# Patient Record
Sex: FEMALE | NOT HISPANIC OR LATINO | Employment: UNEMPLOYED | URBAN - METROPOLITAN AREA
[De-identification: names, ages, dates, MRNs, and addresses within clinical notes are randomized per-mention and may not be internally consistent; named-entity substitution may affect disease eponyms.]

---

## 2024-04-09 ENCOUNTER — LAB (OUTPATIENT)
Dept: LAB | Facility: LAB | Age: 59
End: 2024-04-09
Payer: COMMERCIAL

## 2024-04-09 ENCOUNTER — OFFICE VISIT (OUTPATIENT)
Dept: RHEUMATOLOGY | Facility: CLINIC | Age: 59
End: 2024-04-09
Payer: COMMERCIAL

## 2024-04-09 VITALS — BODY MASS INDEX: 34 KG/M2 | DIASTOLIC BLOOD PRESSURE: 74 MMHG | SYSTOLIC BLOOD PRESSURE: 135 MMHG | WEIGHT: 195 LBS

## 2024-04-09 DIAGNOSIS — M06.00 SERONEGATIVE RHEUMATOID ARTHRITIS (MULTI): ICD-10-CM

## 2024-04-09 DIAGNOSIS — M06.00 SERONEGATIVE RHEUMATOID ARTHRITIS (MULTI): Primary | ICD-10-CM

## 2024-04-09 LAB
ALBUMIN SERPL BCP-MCNC: 4.1 G/DL (ref 3.4–5)
ALP SERPL-CCNC: 63 U/L (ref 33–136)
ALT SERPL W P-5'-P-CCNC: 31 U/L (ref 7–45)
ANION GAP SERPL CALC-SCNC: 12 MMOL/L (ref 10–20)
AST SERPL W P-5'-P-CCNC: 26 U/L (ref 9–39)
BILIRUB SERPL-MCNC: 0.3 MG/DL (ref 0–1.2)
BUN SERPL-MCNC: 16 MG/DL (ref 6–23)
CALCIUM SERPL-MCNC: 10 MG/DL (ref 8.6–10.6)
CCP IGG SERPL-ACNC: <1 U/ML
CHLORIDE SERPL-SCNC: 104 MMOL/L (ref 98–107)
CO2 SERPL-SCNC: 32 MMOL/L (ref 21–32)
CREAT SERPL-MCNC: 0.69 MG/DL (ref 0.5–1.05)
EGFRCR SERPLBLD CKD-EPI 2021: >90 ML/MIN/1.73M*2
ERYTHROCYTE [DISTWIDTH] IN BLOOD BY AUTOMATED COUNT: 13.8 % (ref 11.5–14.5)
ERYTHROCYTE [SEDIMENTATION RATE] IN BLOOD BY WESTERGREN METHOD: 15 MM/H (ref 0–30)
GLUCOSE SERPL-MCNC: 91 MG/DL (ref 74–99)
HBV CORE IGM SER QL: NONREACTIVE
HBV SURFACE AG SERPL QL IA: NONREACTIVE
HCT VFR BLD AUTO: 40.3 % (ref 36–46)
HCV AB SER QL: NONREACTIVE
HGB BLD-MCNC: 12.7 G/DL (ref 12–16)
MCH RBC QN AUTO: 29.1 PG (ref 26–34)
MCHC RBC AUTO-ENTMCNC: 31.5 G/DL (ref 32–36)
MCV RBC AUTO: 92 FL (ref 80–100)
NRBC BLD-RTO: 0 /100 WBCS (ref 0–0)
PLATELET # BLD AUTO: 254 X10*3/UL (ref 150–450)
POTASSIUM SERPL-SCNC: 4.6 MMOL/L (ref 3.5–5.3)
PROT SERPL-MCNC: 6.8 G/DL (ref 6.4–8.2)
RBC # BLD AUTO: 4.37 X10*6/UL (ref 4–5.2)
RHEUMATOID FACT SER NEPH-ACNC: <10 IU/ML (ref 0–15)
SODIUM SERPL-SCNC: 143 MMOL/L (ref 136–145)
URATE SERPL-MCNC: 4.2 MG/DL (ref 2.3–6.7)
WBC # BLD AUTO: 5.1 X10*3/UL (ref 4.4–11.3)

## 2024-04-09 PROCEDURE — 80053 COMPREHEN METABOLIC PANEL: CPT

## 2024-04-09 PROCEDURE — 86431 RHEUMATOID FACTOR QUANT: CPT

## 2024-04-09 PROCEDURE — 99204 OFFICE O/P NEW MOD 45 MIN: CPT | Performed by: INTERNAL MEDICINE

## 2024-04-09 PROCEDURE — 84550 ASSAY OF BLOOD/URIC ACID: CPT

## 2024-04-09 PROCEDURE — 85027 COMPLETE CBC AUTOMATED: CPT

## 2024-04-09 PROCEDURE — 86705 HEP B CORE ANTIBODY IGM: CPT

## 2024-04-09 PROCEDURE — 85652 RBC SED RATE AUTOMATED: CPT

## 2024-04-09 PROCEDURE — 87340 HEPATITIS B SURFACE AG IA: CPT

## 2024-04-09 PROCEDURE — 86200 CCP ANTIBODY: CPT

## 2024-04-09 PROCEDURE — 86481 TB AG RESPONSE T-CELL SUSP: CPT

## 2024-04-09 PROCEDURE — 1036F TOBACCO NON-USER: CPT | Performed by: INTERNAL MEDICINE

## 2024-04-09 PROCEDURE — 86803 HEPATITIS C AB TEST: CPT

## 2024-04-09 PROCEDURE — 36415 COLL VENOUS BLD VENIPUNCTURE: CPT

## 2024-04-09 RX ORDER — HYDROCORTISONE ACETATE 25 MG/1
25 SUPPOSITORY RECTAL 2 TIMES DAILY
COMMUNITY
Start: 2024-01-22

## 2024-04-09 RX ORDER — ROSUVASTATIN CALCIUM 20 MG/1
1 TABLET, COATED ORAL NIGHTLY
COMMUNITY
Start: 2023-12-13

## 2024-04-09 RX ORDER — OMEPRAZOLE 20 MG/1
20 CAPSULE, DELAYED RELEASE ORAL
COMMUNITY
Start: 2023-01-13

## 2024-04-09 RX ORDER — LEFLUNOMIDE 10 MG/1
10 TABLET ORAL
COMMUNITY
End: 2024-04-09

## 2024-04-09 RX ORDER — ADALIMUMAB 40MG/0.4ML
40 KIT SUBCUTANEOUS
Qty: 2 EACH | Refills: 3 | Status: SHIPPED | OUTPATIENT
Start: 2024-04-09 | End: 2024-05-09

## 2024-04-09 RX ORDER — HYDROXYCHLOROQUINE SULFATE 200 MG/1
400 TABLET, FILM COATED ORAL EVERY OTHER DAY
Qty: 180 TABLET | Refills: 0 | Status: SHIPPED | OUTPATIENT
Start: 2024-04-09 | End: 2024-06-03 | Stop reason: DRUGHIGH

## 2024-04-09 RX ORDER — METHYLPREDNISOLONE 4 MG
1 TABLET, DOSE PACK ORAL EVERY OTHER DAY
COMMUNITY

## 2024-04-09 RX ORDER — DICYCLOMINE HYDROCHLORIDE 20 MG/1
20 TABLET ORAL
COMMUNITY
Start: 2021-02-23

## 2024-04-09 RX ORDER — ASPIRIN 81 MG/1
81 TABLET ORAL
COMMUNITY
Start: 2022-08-17

## 2024-04-09 RX ORDER — LEFLUNOMIDE 20 MG/1
20 TABLET ORAL DAILY
Qty: 90 TABLET | Refills: 0 | Status: SHIPPED | OUTPATIENT
Start: 2024-04-09 | End: 2024-07-08

## 2024-04-09 RX ORDER — ALBUTEROL SULFATE 90 UG/1
2 AEROSOL, METERED RESPIRATORY (INHALATION) EVERY 4 HOURS PRN
COMMUNITY
Start: 2023-09-07

## 2024-04-09 RX ORDER — CALCIUM CARBONATE 200(500)MG
TABLET,CHEWABLE ORAL
COMMUNITY
Start: 2007-09-18

## 2024-04-09 RX ORDER — VITAMIN E MIXED 400 UNIT
400 CAPSULE ORAL EVERY OTHER DAY
COMMUNITY

## 2024-04-09 RX ORDER — GUAIFENESIN AND PHENYLEPHRINE HCL 400; 10 MG/1; MG/1
TABLET ORAL
COMMUNITY

## 2024-04-09 RX ORDER — ADALIMUMAB 40MG/0.4ML
40 KIT SUBCUTANEOUS
COMMUNITY
Start: 2024-04-05 | End: 2024-04-09 | Stop reason: SDUPTHER

## 2024-04-09 RX ORDER — LEFLUNOMIDE 20 MG/1
20 TABLET ORAL DAILY
COMMUNITY
End: 2024-04-09 | Stop reason: SDUPTHER

## 2024-04-09 RX ORDER — NITROGLYCERIN 0.4 MG/1
0.4 TABLET SUBLINGUAL
COMMUNITY
Start: 2022-08-20

## 2024-04-09 RX ORDER — HYDROXYCHLOROQUINE SULFATE 200 MG/1
400 TABLET, FILM COATED ORAL EVERY OTHER DAY
COMMUNITY
Start: 2013-05-17 | End: 2024-04-09 | Stop reason: SDUPTHER

## 2024-04-09 RX ORDER — LOSARTAN POTASSIUM 100 MG/1
100 TABLET ORAL
COMMUNITY
Start: 2023-09-15

## 2024-04-09 RX ORDER — METOPROLOL SUCCINATE 25 MG/1
25 TABLET, EXTENDED RELEASE ORAL
COMMUNITY
Start: 2023-09-15

## 2024-04-09 RX ORDER — LOPERAMIDE HYDROCHLORIDE 2 MG/1
2 CAPSULE ORAL EVERY 6 HOURS PRN
COMMUNITY
Start: 2024-03-18 | End: 2024-04-17

## 2024-04-09 NOTE — PATIENT INSTRUCTIONS
Continue Humira, leflunomide and hydroxychloroquine as prescribed.  Call me if any question.  Follow-up in 4 months.

## 2024-04-11 LAB
NIL(NEG) CONTROL SPOT COUNT: NORMAL
PANEL A SPOT COUNT: 0
PANEL B SPOT COUNT: 0
POS CONTROL SPOT COUNT: NORMAL
T-SPOT. TB INTERPRETATION: NEGATIVE

## 2024-06-03 ENCOUNTER — TELEPHONE (OUTPATIENT)
Dept: PRIMARY CARE | Facility: CLINIC | Age: 59
End: 2024-06-03
Payer: COMMERCIAL

## 2024-06-03 DIAGNOSIS — M06.00 SERONEGATIVE RHEUMATOID ARTHRITIS (MULTI): ICD-10-CM

## 2024-06-03 RX ORDER — HYDROXYCHLOROQUINE SULFATE 200 MG/1
400 TABLET, FILM COATED ORAL DAILY
Qty: 180 TABLET | Refills: 0 | Status: SHIPPED | OUTPATIENT
Start: 2024-06-03 | End: 2024-06-03 | Stop reason: SDUPTHER

## 2024-06-03 RX ORDER — HYDROXYCHLOROQUINE SULFATE 200 MG/1
400 TABLET, FILM COATED ORAL DAILY
Qty: 180 TABLET | Refills: 0 | Status: SHIPPED | OUTPATIENT
Start: 2024-06-03 | End: 2024-09-01

## 2024-06-03 NOTE — TELEPHONE ENCOUNTER
Patient stated she is out of the following medication because the prescription was writtten to take 2 tablets every other day but, she takes 2 tablets daily. Patient requesting a corrected prescription for   hydroxychloroquine (Plaquenil) 200 mg tablet [[id number]] take 2 tablets every day    Order Details  Dose: 400 mg   Please send to [facility] in [place].

## 2024-08-05 DIAGNOSIS — M06.00 SERONEGATIVE RHEUMATOID ARTHRITIS (MULTI): ICD-10-CM

## 2024-08-05 RX ORDER — LEFLUNOMIDE 20 MG/1
20 TABLET ORAL DAILY
Qty: 90 TABLET | Refills: 0 | Status: SHIPPED | OUTPATIENT
Start: 2024-08-05 | End: 2024-11-03

## 2024-08-09 ENCOUNTER — APPOINTMENT (OUTPATIENT)
Dept: RHEUMATOLOGY | Facility: CLINIC | Age: 59
End: 2024-08-09
Payer: COMMERCIAL

## 2024-08-09 ENCOUNTER — LAB (OUTPATIENT)
Dept: LAB | Facility: LAB | Age: 59
End: 2024-08-09
Payer: COMMERCIAL

## 2024-08-09 VITALS — WEIGHT: 195 LBS | BODY MASS INDEX: 34 KG/M2

## 2024-08-09 DIAGNOSIS — M06.00 SERONEGATIVE RHEUMATOID ARTHRITIS (MULTI): Primary | ICD-10-CM

## 2024-08-09 DIAGNOSIS — M06.00 SERONEGATIVE RHEUMATOID ARTHRITIS (MULTI): ICD-10-CM

## 2024-08-09 LAB
ALBUMIN SERPL BCP-MCNC: 4.4 G/DL (ref 3.4–5)
ALP SERPL-CCNC: 67 U/L (ref 33–136)
ALT SERPL W P-5'-P-CCNC: 34 U/L (ref 7–45)
AST SERPL W P-5'-P-CCNC: 30 U/L (ref 9–39)
BILIRUB DIRECT SERPL-MCNC: 0.1 MG/DL (ref 0–0.3)
BILIRUB SERPL-MCNC: 0.4 MG/DL (ref 0–1.2)
CRP SERPL-MCNC: 0.2 MG/DL
ERYTHROCYTE [DISTWIDTH] IN BLOOD BY AUTOMATED COUNT: 13.8 % (ref 11.5–14.5)
ERYTHROCYTE [SEDIMENTATION RATE] IN BLOOD BY WESTERGREN METHOD: 6 MM/H (ref 0–30)
HCT VFR BLD AUTO: 40.7 % (ref 36–46)
HGB BLD-MCNC: 12.8 G/DL (ref 12–16)
MCH RBC QN AUTO: 29 PG (ref 26–34)
MCHC RBC AUTO-ENTMCNC: 31.4 G/DL (ref 32–36)
MCV RBC AUTO: 92 FL (ref 80–100)
NRBC BLD-RTO: 0 /100 WBCS (ref 0–0)
PLATELET # BLD AUTO: 229 X10*3/UL (ref 150–450)
PROT SERPL-MCNC: 7 G/DL (ref 6.4–8.2)
RBC # BLD AUTO: 4.41 X10*6/UL (ref 4–5.2)
WBC # BLD AUTO: 4.8 X10*3/UL (ref 4.4–11.3)

## 2024-08-09 PROCEDURE — 80076 HEPATIC FUNCTION PANEL: CPT

## 2024-08-09 PROCEDURE — 85027 COMPLETE CBC AUTOMATED: CPT

## 2024-08-09 PROCEDURE — 86140 C-REACTIVE PROTEIN: CPT

## 2024-08-09 PROCEDURE — 85652 RBC SED RATE AUTOMATED: CPT

## 2024-08-09 PROCEDURE — 99213 OFFICE O/P EST LOW 20 MIN: CPT | Performed by: INTERNAL MEDICINE

## 2024-08-09 PROCEDURE — 36415 COLL VENOUS BLD VENIPUNCTURE: CPT

## 2024-08-09 PROCEDURE — 1036F TOBACCO NON-USER: CPT | Performed by: INTERNAL MEDICINE

## 2024-08-09 RX ORDER — HYDROXYCHLOROQUINE SULFATE 200 MG/1
400 TABLET, FILM COATED ORAL DAILY
Qty: 180 TABLET | Refills: 0 | Status: SHIPPED | OUTPATIENT
Start: 2024-08-09 | End: 2024-11-07

## 2024-08-09 RX ORDER — ADALIMUMAB 40MG/0.4ML
40 KIT SUBCUTANEOUS
Qty: 2 EACH | Refills: 2 | Status: SHIPPED | OUTPATIENT
Start: 2024-08-09 | End: 2024-08-09 | Stop reason: SDUPTHER

## 2024-08-09 RX ORDER — ADALIMUMAB 40MG/0.4ML
40 KIT SUBCUTANEOUS
Qty: 2 EACH | Refills: 2 | Status: SHIPPED | OUTPATIENT
Start: 2024-08-09

## 2024-08-09 RX ORDER — METHYLPREDNISOLONE 4 MG/1
TABLET ORAL
Qty: 21 TABLET | Refills: 0 | Status: SHIPPED | OUTPATIENT
Start: 2024-08-09 | End: 2024-08-16

## 2024-08-09 NOTE — PROGRESS NOTES
Subjective  . Karoline Hung is a 59 y.o. female who presents for Follow-up.    HPI. 59-year-old female with history of seronegative inflammatory arthritis, +ve HLA-B27, CAD s/p PCI, sleep GERD, plantar fasciitis, neuropathy and s/p L4-L5 surgery presented for follow-up.    She reports worsening soreness and stiffness in the hands and feet for the past 1-1/2-week.  Also notes soreness in the neck and shoulders.  She rates pain up to 5/10 at times.  She has not noticed swelling of the hand and feet.  She received corticosteroid injection at the left second toe area for bursitis about 3 weeks ago at podiatrist office.      Immunosuppression: Hydroxychloroquine, leflunomide and Humira.(1/2024).     Past immunosuppression:  1.  Prednisone.  2.  Methotrexate.  Excessive mucus production.    Review of Systems   All other systems reviewed and are negative.    Objective     Weight 88.5 kg (195 lb).    Physical Exam.  Gen. AAO x3, NAD.  HEENT: No pallor or icterus, PERRLA, EOMI. No cervical lymphadenopathy .  Skin: No rashes.  Heart: S1, S2/ RRR.   Lungs: CTA B.  Abdomen: Soft, NT/ND.  MSK: Bilateral Heberden's nodes.  No swelling and tenderness of the MCP and PIP joints.  Bilateral wrist without swelling and tenderness.  Handgrip fine.  Bilateral elbows without swelling and tenderness.  Bilateral shoulder with full range of motion.  Bilateral ankle and MTPs without swelling and tenderness.    Neuro: Sensation to touch intact.Strength 5/5 throughout.   Psych:Appropriate mood and behavior  EXT: No edema    Assessment/Plan  . 59-year-old female with history of seronegative inflammatory arthritis, +ve HLA-B27, CAD s/p PCI, sleep GERD, plantar fasciitis, neuropathy and s/p L4-L5 surgery presented for follow-up.      #1: Seronegative inflammatory arthritis.  -Joint pain appears noninflammatory, however we will give a trial of Medrol Dosepak to see the response.  -Continue Humira injection every other week.  -Continue  leflunomide 20 mg daily.  -Continue hydroxychloroquine 2 pills daily.  Annual eye exam discussed.  -Labs.    Follow-up in 4 months.     This note was partially generated using the Dragon Voice recognition system. There may be some incorrect wording, spelling and/or spelling errors or punctuation errors that were not corrected prior to committing the note to the medical record.    Problem List Items Addressed This Visit    None  Visit Diagnoses       Seronegative rheumatoid arthritis (Multi)    -  Primary    Relevant Medications    hydroxychloroquine (Plaquenil) 200 mg tablet    Humira,CF, Pen 40 mg/0.4 mL pen injector kit pen-injector    methylPREDNISolone (Medrol Dospak) 4 mg tablets    Other Relevant Orders    C-Reactive Protein    CBC    Hepatic Function Panel    Sedimentation Rate                 Active Ambulatory Problems     Diagnosis Date Noted    No Active Ambulatory Problems     Resolved Ambulatory Problems     Diagnosis Date Noted    No Resolved Ambulatory Problems     Past Medical History:   Diagnosis Date    Coronary artery disease     GERD (gastroesophageal reflux disease)     HLA B27 positive     Hyperlipidemia     Hypertension     Personal history of other specified conditions 03/23/2021    Seronegative inflammatory arthritis        Family History   Problem Relation Name Age of Onset    Arthritis Mother      Arthritis Father      Arthritis Maternal Grandmother         No past surgical history on file.    Social History     Tobacco Use   Smoking Status Former    Types: Cigarettes   Smokeless Tobacco Never       Allergies  Ticagrelor, Lisinopril, Clonidine, and Sulfa (sulfonamide antibiotics)    Current Meds  Current Outpatient Medications   Medication Instructions    albuterol 90 mcg/actuation inhaler 2 puffs, inhalation, Every 4 hours PRN    aspirin 81 mg, oral, Daily RT    B complex-vitamin C-folic acid (Nephro-Arya Rx) 1- mg-mg-mcg tablet 1 tablet, oral, Every other day    calcium carbonate  (Calcium Antacid) 200 mg calcium chewable tablet oral    dicyclomine (BENTYL) 20 mg, oral, Daily RT    glucosamine sulfate 500 mg tablet 1 tablet, oral, Every other day    Humira(CF) Pen 40 mg, subcutaneous, Every 14 days    hydrocortisone (ANUSOL-HC) 25 mg, rectal, 2 times daily    hydroxychloroquine (PLAQUENIL) 400 mg, oral, Daily    leflunomide (ARAVA) 20 mg, oral, Daily    losartan (COZAAR) 100 mg, oral, Daily RT    methylPREDNISolone (Medrol Dospak) 4 mg tablets Follow schedule on package instructions    metoprolol succinate XL (TOPROL-XL) 25 mg, oral, Daily RT    nitroglycerin (NITROSTAT) 0.4 mg, sublingual    omeprazole (PRILOSEC) 20 mg, oral, Daily RT    prasterone (dhea) 50 mg, oral, Every other day    rosuvastatin (Crestor) 20 mg tablet 1 tablet, oral, Nightly    turmeric root extract 500 mg capsule oral, Every 48 hours    vitamin E 400 Units, oral, Every other day        Lab Results   Component Value Date    RF <10 04/09/2024    SEDRATE 15 04/09/2024    URICACID 4.2 04/09/2024             Nehemiah Ram MD

## 2024-08-09 NOTE — PATIENT INSTRUCTIONS
Take Medrol Dosepak.  Continue Humira injection, hydroxychloroquine and leflunomide as discussed.  Call me if any question.  Follow-up in 4 months.

## 2024-09-28 ENCOUNTER — OFFICE VISIT (OUTPATIENT)
Dept: URGENT CARE | Age: 59
End: 2024-09-28
Payer: COMMERCIAL

## 2024-09-28 ENCOUNTER — ANCILLARY PROCEDURE (OUTPATIENT)
Dept: URGENT CARE | Age: 59
End: 2024-09-28
Payer: COMMERCIAL

## 2024-09-28 VITALS
TEMPERATURE: 98.3 F | DIASTOLIC BLOOD PRESSURE: 65 MMHG | SYSTOLIC BLOOD PRESSURE: 141 MMHG | BODY MASS INDEX: 33.66 KG/M2 | RESPIRATION RATE: 16 BRPM | HEART RATE: 63 BPM | WEIGHT: 190 LBS | HEIGHT: 63 IN | OXYGEN SATURATION: 93 %

## 2024-09-28 DIAGNOSIS — M79.671 RIGHT FOOT PAIN: Primary | ICD-10-CM

## 2024-09-28 DIAGNOSIS — S90.851A FOREIGN BODY IN RIGHT FOOT, INITIAL ENCOUNTER: ICD-10-CM

## 2024-09-28 DIAGNOSIS — M79.671 RIGHT FOOT PAIN: ICD-10-CM

## 2024-09-28 RX ORDER — DOXYCYCLINE 100 MG/1
100 CAPSULE ORAL 2 TIMES DAILY
Qty: 20 CAPSULE | Refills: 0 | Status: SHIPPED | OUTPATIENT
Start: 2024-09-28 | End: 2024-10-08

## 2024-09-28 NOTE — PROGRESS NOTES
Subjective   Patient ID: Karoline Hung is a 59 y.o. female. They present today with a chief complaint of Foot Swelling (C/o ball of foot swelling, painful and hot since yesterday).    History of Present Illness  HPI  Pt is a 59 yr old female who presents with foot pain and swelling she said the swelling started today.  She stepped on a needle 2 weeks ago in the same foot she is not sure if its from that incident  Past Medical History  Allergies as of 09/28/2024 - Reviewed 08/09/2024   Allergen Reaction Noted    Ticagrelor Shortness of breath 03/01/2023    Lisinopril Cough 07/15/2022    Clonidine Itching 03/01/2023    Sulfa (sulfonamide antibiotics) Rash 04/09/2024       (Not in a hospital admission)       Past Medical History:   Diagnosis Date    Coronary artery disease     GERD (gastroesophageal reflux disease)     HLA B27 positive     Hyperlipidemia     Hypertension     Personal history of other specified conditions 03/23/2021    History of multiple pulmonary nodules    Seronegative inflammatory arthritis        No past surgical history on file.     reports that she has quit smoking. Her smoking use included cigarettes. She has never used smokeless tobacco.    Review of Systems  Review of Systems  Gen: No fatigue, fever, sweats.  Head: No headache, trauma.  Eyes: No vision loss, double vision, drainage, eye pain.  ENT: No hearing changes, pain, epistaxis, congestion  Cardiac: No chest pain  Pulmonary: No shortness of breath,  pleuritic pain,   Heme/lymph: No swollen glands  GI: No abdominal pain, nausea, vomiting, diarrhea  : No  dysuria, frequency, urgency, hematuria  Musculoskeletal: + limb pain, joint pain, back pain, + swelling and stiffness.  Skin: No rashes, pruritus, lumps, lesions.  Neuro: No Numbness, tingling, or weakness.  Psych: No  anxiety     Review of systems is otherwise negative unless stated above or in history of present illness.                             Objective    Vitals:     "09/28/24 1727   BP: 141/65   BP Location: Left arm   Patient Position: Sitting   BP Cuff Size: Adult long   Pulse: 63   Resp: 16   Temp: 36.8 °C (98.3 °F)   TempSrc: Oral   SpO2: 93%   Weight: 86.2 kg (190 lb)   Height: 1.6 m (5' 3\")     No LMP recorded.    Physical Exam  Foot Assessment:  General: Vitals noted. No distress. Afebrile.  Neck: Supple. No adenopathy.  Cardiac: Regular rate and rhythm. No murmur.  Pulmonary: Equal breath sounds bilaterally. No adventitious breath sounds.  Abdomen: Soft, nontender, nonsurgical. Normoactive bowel sounds  Back: Nontender throughout  Lower extremity: Exam of the right foot shows + swelling and pain to the top of her foot near the base of her toes.  Old puncture wound to the bottom of the foot plantar aspect base of toes. The ankle is non-tender. There is no deformity, edema, ecchymosis. Is neurovascularly intact distally. The remainder of the lower extremity is non-tender.  Neuro: No focal neurologic deficits, NIH score of 0.  Procedures    Point of Care Test & Imaging Results from this visit  No results found for this visit on 09/28/24.   No results found.    Diagnostic study results (if any) were reviewed by JOSSELINE YATES.    Assessment/Plan   Allergies, medications, history, and pertinent labs/EKGs/Imaging reviewed by CIRO Fraser-CNP.     Medical Decision Making  History and physical is consistent with foreign body in foot confirmed by xray and possible infection at the site.  She will be sent to podiatry a referral was given.  She will be put on abx in case of infection.  No fracture or dislocation on xray.      Orders and Diagnoses  There are no diagnoses linked to this encounter.    Medical Admin Record      Patient disposition: Home    Electronically signed by JOSSELINE BELLAMY X-RAY  5:31 PM      "

## 2024-09-28 NOTE — PATIENT INSTRUCTIONS
You have a foreign body in the foot we will put you on abx and follow up with podiatry.  Abx are in case it is getting infected due to it being in the foot.

## 2024-10-04 ENCOUNTER — APPOINTMENT (OUTPATIENT)
Dept: PRIMARY CARE | Facility: CLINIC | Age: 59
End: 2024-10-04
Payer: COMMERCIAL

## 2024-10-04 ENCOUNTER — LAB (OUTPATIENT)
Dept: LAB | Facility: LAB | Age: 59
End: 2024-10-04
Payer: COMMERCIAL

## 2024-10-04 VITALS — SYSTOLIC BLOOD PRESSURE: 137 MMHG | DIASTOLIC BLOOD PRESSURE: 77 MMHG

## 2024-10-04 DIAGNOSIS — R74.8 ABNORMAL LEVELS OF OTHER SERUM ENZYMES: Primary | ICD-10-CM

## 2024-10-04 DIAGNOSIS — K64.8 INTERNAL HEMORRHOID: Primary | ICD-10-CM

## 2024-10-04 DIAGNOSIS — I10 PRIMARY HYPERTENSION: ICD-10-CM

## 2024-10-04 DIAGNOSIS — Z00.00 HEALTH CARE MAINTENANCE: Primary | ICD-10-CM

## 2024-10-04 DIAGNOSIS — E78.2 MIXED HYPERLIPIDEMIA: ICD-10-CM

## 2024-10-04 DIAGNOSIS — M47.892 OTHER OSTEOARTHRITIS OF SPINE, CERVICAL REGION: ICD-10-CM

## 2024-10-04 LAB — CK SERPL-CCNC: 168 U/L (ref 0–215)

## 2024-10-04 PROCEDURE — 3078F DIAST BP <80 MM HG: CPT | Performed by: INTERNAL MEDICINE

## 2024-10-04 PROCEDURE — 99204 OFFICE O/P NEW MOD 45 MIN: CPT | Performed by: INTERNAL MEDICINE

## 2024-10-04 PROCEDURE — 36415 COLL VENOUS BLD VENIPUNCTURE: CPT

## 2024-10-04 PROCEDURE — 82550 ASSAY OF CK (CPK): CPT

## 2024-10-04 PROCEDURE — 3075F SYST BP GE 130 - 139MM HG: CPT | Performed by: INTERNAL MEDICINE

## 2024-10-04 RX ORDER — FLUTICASONE PROPIONATE 50 MCG
2 SPRAY, SUSPENSION (ML) NASAL DAILY
COMMUNITY
Start: 2024-06-10 | End: 2024-10-04 | Stop reason: SDUPTHER

## 2024-10-04 NOTE — PROGRESS NOTES
Subjective   Patient ID: Karoline Hung is a 59 y.o. female who presents for No chief complaint on file..    HPI the patient for first time Sipp dated.  She no chest pain no shortness of breath has some rectal bump for some many months  Maybe or maybe not associated with hemorrhoids with no bleeding feels when she wipes has had a foot issue including dermatitis and it stepped on a foreign body has been following up with the podiatrist blood pressure has been elevated elsewhere but was better today follows up with the rheumatologist is on Humira and other antiimmune medications bowels otherwise normal no dysuria    Past medical history noted and unchanged    Medications including Crestor    Allergies noted and unchanged see EMR       vital signs noted alert and oriented x 3 NCAT  Review of Systems    Objective   There were no vitals taken for this visit.    Physical Exam no JVD or bruit chest clear to auscultation and percussion CV regular rate and rhythm S1-S2 without murmur gallop or rub abdomen obese soft nontender normal active bowel sounds rectum there is a right-sided internal hemorrhoid with mild rectal prolapse no bleeding extremities no clubbing cyanosis or edema normal distal pulses DTR 2+ msk hand and feet arthritis change, cervical spine ok rom limited extension    Assessment/Plan impression hypertension rectal prolapse/internal hemorrhoid hyperlipidemia other diagnoses rheumatoid diagnoses OA neck  Plan rom exercise for the neck reviewed films and disc and forameni d/w rheumatoid and feet and hands advise on alternate medication and continue with rom exercise there is also eczematous or psoriasis eczematous changes on the anterior of the right foot corticosteroid cream was recently prescribed by the podiatrist continue follow-up with the foreign body with the podiatrist continue with diet exercise weight loss especially her swimming class for the blood pressure follow-up with additional blood work  previous blood work reviewed and the lipids with the Crestor follow-up with cardiology diet weight loss exercise stool softener and Metamucil and follow-up with colorectal surgery she is up-to-date on her colonoscopy per her see EMR then recheck 1 month based on above  [id number] [id number]

## 2024-10-05 RX ORDER — FLUTICASONE PROPIONATE 50 MCG
2 SPRAY, SUSPENSION (ML) NASAL DAILY
Qty: 16 G | Refills: 3 | Status: SHIPPED | OUTPATIENT
Start: 2024-10-05

## 2024-10-07 DIAGNOSIS — R19.7 DIARRHEA, UNSPECIFIED TYPE: Primary | ICD-10-CM

## 2024-10-07 RX ORDER — LOPERAMIDE HYDROCHLORIDE 2 MG/1
2 CAPSULE ORAL EVERY 6 HOURS PRN
COMMUNITY
Start: 2024-05-24 | End: 2024-10-07 | Stop reason: SDUPTHER

## 2024-10-10 RX ORDER — LOPERAMIDE HYDROCHLORIDE 2 MG/1
CAPSULE ORAL
Qty: 30 CAPSULE | Refills: 0 | Status: SHIPPED | OUTPATIENT
Start: 2024-10-10

## 2024-10-15 ENCOUNTER — TELEPHONE (OUTPATIENT)
Dept: PRIMARY CARE | Facility: CLINIC | Age: 59
End: 2024-10-15

## 2024-10-29 DIAGNOSIS — M06.00 SERONEGATIVE RHEUMATOID ARTHRITIS (MULTI): ICD-10-CM

## 2024-10-29 RX ORDER — LEFLUNOMIDE 20 MG/1
20 TABLET ORAL DAILY
Qty: 10 TABLET | Refills: 0 | Status: SHIPPED | OUTPATIENT
Start: 2024-10-29

## 2024-10-30 ENCOUNTER — OFFICE VISIT (OUTPATIENT)
Dept: DERMATOLOGY | Facility: CLINIC | Age: 59
End: 2024-10-30
Payer: COMMERCIAL

## 2024-10-30 DIAGNOSIS — R21 RASH AND OTHER NONSPECIFIC SKIN ERUPTION: Primary | ICD-10-CM

## 2024-10-30 DIAGNOSIS — I78.1 TELANGIECTASIA: ICD-10-CM

## 2024-10-30 PROCEDURE — 11104 PUNCH BX SKIN SINGLE LESION: CPT | Performed by: DERMATOLOGY

## 2024-10-30 PROCEDURE — 1036F TOBACCO NON-USER: CPT | Performed by: DERMATOLOGY

## 2024-10-30 PROCEDURE — 99204 OFFICE O/P NEW MOD 45 MIN: CPT | Performed by: DERMATOLOGY

## 2024-10-30 RX ORDER — ALCLOMETASONE DIPROPIONATE 0.5 MG/G
CREAM TOPICAL 2 TIMES DAILY
COMMUNITY

## 2024-10-30 RX ORDER — CLOBETASOL PROPIONATE 0.5 MG/G
CREAM TOPICAL
Qty: 60 G | Refills: 2 | Status: SHIPPED | OUTPATIENT
Start: 2024-10-30

## 2024-10-30 ASSESSMENT — DERMATOLOGY PATIENT ASSESSMENT
DO YOU HAVE IRREGULAR MENSTRUAL CYCLES: NO
DO YOU USE A TANNING BED: NO
DO YOU USE SUNSCREEN: OCCASIONALLY
WHERE ARE THESE NEW OR CHANGING LESIONS LOCATED: NOSE
ARE YOU TRYING TO GET PREGNANT: NO
DO YOU HAVE ANY NEW OR CHANGING LESIONS: YES
ARE YOU AN ORGAN TRANSPLANT RECIPIENT: NO

## 2024-10-30 ASSESSMENT — ITCH NUMERIC RATING SCALE: HOW SEVERE IS YOUR ITCHING?: 6

## 2024-10-30 ASSESSMENT — DERMATOLOGY QUALITY OF LIFE (QOL) ASSESSMENT
RATE HOW BOTHERED YOU ARE BY SYMPTOMS OF YOUR SKIN PROBLEM (EG, ITCHING, STINGING BURNING, HURTING OR SKIN IRRITATION): 4
RATE HOW BOTHERED YOU ARE BY EFFECTS OF YOUR SKIN PROBLEMS ON YOUR ACTIVITIES (EG, GOING OUT, ACCOMPLISHING WHAT YOU WANT, WORK ACTIVITIES OR YOUR RELATIONSHIPS WITH OTHERS): 2
ARE THERE EXCLUSIONS OR EXCEPTIONS FOR THE QUALITY OF LIFE ASSESSMENT: NO
WHAT SINGLE SKIN CONDITION LISTED BELOW IS THE PATIENT ANSWERING THE QUALITY-OF-LIFE ASSESSMENT QUESTIONS ABOUT: DERMATITIS
RATE HOW EMOTIONALLY BOTHERED YOU ARE BY YOUR SKIN PROBLEM (FOR EXAMPLE, WORRY, EMBARRASSMENT, FRUSTRATION): 4

## 2024-10-30 ASSESSMENT — PATIENT GLOBAL ASSESSMENT (PGA): PATIENT GLOBAL ASSESSMENT: PATIENT GLOBAL ASSESSMENT:  3 - MODERATE

## 2024-11-01 ENCOUNTER — TELEPHONE (OUTPATIENT)
Dept: DERMATOLOGY | Facility: CLINIC | Age: 59
End: 2024-11-01
Payer: COMMERCIAL

## 2024-11-01 LAB
LABORATORY COMMENT REPORT: NORMAL
PATH REPORT.FINAL DX SPEC: NORMAL
PATH REPORT.GROSS SPEC: NORMAL
PATH REPORT.RELEVANT HX SPEC: NORMAL
PATH REPORT.TOTAL CANCER: NORMAL

## 2024-11-05 ENCOUNTER — TELEPHONE (OUTPATIENT)
Dept: PRIMARY CARE | Facility: CLINIC | Age: 59
End: 2024-11-05
Payer: COMMERCIAL

## 2024-11-05 ENCOUNTER — TELEPHONE (OUTPATIENT)
Dept: RHEUMATOLOGY | Facility: CLINIC | Age: 59
End: 2024-11-05
Payer: COMMERCIAL

## 2024-11-05 DIAGNOSIS — M06.00 SERONEGATIVE RHEUMATOID ARTHRITIS (MULTI): ICD-10-CM

## 2024-11-05 RX ORDER — LEFLUNOMIDE 20 MG/1
20 TABLET ORAL DAILY
Qty: 30 TABLET | Refills: 0 | Status: SHIPPED | OUTPATIENT
Start: 2024-11-05 | End: 2024-12-05

## 2024-11-05 RX ORDER — ADALIMUMAB 40MG/0.4ML
40 KIT SUBCUTANEOUS
Qty: 2 EACH | Refills: 2 | Status: SHIPPED | OUTPATIENT
Start: 2024-11-05

## 2024-11-05 NOTE — TELEPHONE ENCOUNTER
Patient called and she needs two refills    Humira CF, pen 40mg/04ml pen-injector #2 inject 1 pen under the skin every 14 days    [facility]       Leflunomide 20 mg tablets # 30 take one tablet by mouth every day     [facility] [phone]     Appointment in December

## 2024-11-08 ENCOUNTER — TELEPHONE (OUTPATIENT)
Dept: DERMATOLOGY | Facility: CLINIC | Age: 59
End: 2024-11-08
Payer: COMMERCIAL

## 2024-11-08 NOTE — TELEPHONE ENCOUNTER
Pt MARVEL stating she was returning Dr. Garcia's call r/t biopsy results. She can be reached at [phone]

## 2024-11-14 ENCOUNTER — TELEPHONE (OUTPATIENT)
Dept: DERMATOLOGY | Facility: CLINIC | Age: 59
End: 2024-11-14
Payer: COMMERCIAL

## 2024-12-01 DIAGNOSIS — M06.00 SERONEGATIVE RHEUMATOID ARTHRITIS (MULTI): ICD-10-CM

## 2024-12-01 DIAGNOSIS — R19.7 DIARRHEA, UNSPECIFIED TYPE: ICD-10-CM

## 2024-12-02 RX ORDER — HYDROXYCHLOROQUINE SULFATE 200 MG/1
TABLET, FILM COATED ORAL DAILY
Qty: 180 TABLET | Refills: 0 | Status: SHIPPED | OUTPATIENT
Start: 2024-12-02 | End: 2024-12-06 | Stop reason: DRUGHIGH

## 2024-12-03 ENCOUNTER — OFFICE VISIT (OUTPATIENT)
Dept: PRIMARY CARE | Facility: CLINIC | Age: 59
End: 2024-12-03
Payer: COMMERCIAL

## 2024-12-03 VITALS — RESPIRATION RATE: 12 BRPM | BODY MASS INDEX: 33.66 KG/M2 | HEIGHT: 63 IN | HEART RATE: 72 BPM | OXYGEN SATURATION: 99 %

## 2024-12-03 DIAGNOSIS — Z00.00 HEALTH CARE MAINTENANCE: ICD-10-CM

## 2024-12-03 DIAGNOSIS — Z79.899 MEDICATION MANAGEMENT: ICD-10-CM

## 2024-12-03 DIAGNOSIS — L50.0 ALLERGIC URTICARIA: ICD-10-CM

## 2024-12-03 DIAGNOSIS — R19.7 DIARRHEA, UNSPECIFIED TYPE: ICD-10-CM

## 2024-12-03 DIAGNOSIS — M25.512 LEFT SHOULDER PAIN, UNSPECIFIED CHRONICITY: Primary | ICD-10-CM

## 2024-12-03 PROCEDURE — 99213 OFFICE O/P EST LOW 20 MIN: CPT | Performed by: INTERNAL MEDICINE

## 2024-12-03 RX ORDER — PREDNISONE 20 MG/1
20 TABLET ORAL 2 TIMES DAILY
Qty: 10 TABLET | Refills: 0 | Status: SHIPPED | OUTPATIENT
Start: 2024-12-03 | End: 2024-12-08

## 2024-12-03 NOTE — PROGRESS NOTES
"Subjective   Patient ID: Karoline Hung is a 59 y.o. female who presents for RED RASH/BUMPS.    HPI   Follow-up visit no chest pain no shortness of breath sometimes ongoing diarrhea has used loperamide in the past has been helpful no fever no wheezing but has broken out in the past 10 days with large amount of wrinkled spots with scabs on them that she had initially had a lesion on her foot had seen the dermatologist had a nondiagnostic biopsy per her and has been using higher dose steroid cream without much effect is a significant urticarial factor  Review of Systems    Objective   Pulse 70   Resp 18   Ht 1.6 m (5' 3\")   SpO2 99%   BMI 33.66 kg/m²     Physical Exam vital signs noted alert and oriented x 3 NCAT no JVD or bruit chest clear to auscultation cor regular rate rhythm S1-S2 extremities no clubbing cyanosis or edema normal distal pulses skin multiple small ring haloed lesions over the thighs abdomen and feet and palm more eczematous in nature or psoriatic    Assessment/Plan    impression diarrhea ringed rash with halo effect urticaria other diagnoses  Plan Allegra 180 mg p.o. daily available over-the-counter May use Benadryl 25 mg p.o. nightly okay for the steroid cream per the dermatologist reviewed the dermatology biopsy okay for renewal of loperamide see EMR did not come up on the worst last discussed with fiber in the diet good diet overall good water consumption follow-up with dermatology as needed prescription prednisone 20 mg p.o. twice daily #10 refill 0 and recheck within 1 week and for regular visit    Review biopsy, d/w shoulder and PT       "

## 2024-12-05 RX ORDER — LOPERAMIDE HYDROCHLORIDE 2 MG/1
CAPSULE ORAL
Qty: 30 CAPSULE | Refills: 0 | OUTPATIENT
Start: 2024-12-05

## 2024-12-06 ENCOUNTER — APPOINTMENT (OUTPATIENT)
Dept: RHEUMATOLOGY | Facility: CLINIC | Age: 59
End: 2024-12-06
Payer: COMMERCIAL

## 2024-12-06 ENCOUNTER — LAB (OUTPATIENT)
Dept: LAB | Facility: LAB | Age: 59
End: 2024-12-06
Payer: COMMERCIAL

## 2024-12-06 VITALS
HEART RATE: 58 BPM | WEIGHT: 188 LBS | DIASTOLIC BLOOD PRESSURE: 72 MMHG | SYSTOLIC BLOOD PRESSURE: 142 MMHG | BODY MASS INDEX: 33.31 KG/M2 | HEIGHT: 63 IN

## 2024-12-06 DIAGNOSIS — M06.00 SERONEGATIVE RHEUMATOID ARTHRITIS (MULTI): Primary | ICD-10-CM

## 2024-12-06 DIAGNOSIS — M06.00 SERONEGATIVE RHEUMATOID ARTHRITIS (MULTI): ICD-10-CM

## 2024-12-06 LAB
ALBUMIN SERPL BCP-MCNC: 4.6 G/DL (ref 3.4–5)
ALP SERPL-CCNC: 68 U/L (ref 33–136)
ALT SERPL W P-5'-P-CCNC: 31 U/L (ref 7–45)
ANION GAP SERPL CALC-SCNC: 13 MMOL/L (ref 10–20)
AST SERPL W P-5'-P-CCNC: 20 U/L (ref 9–39)
BILIRUB SERPL-MCNC: 0.3 MG/DL (ref 0–1.2)
BUN SERPL-MCNC: 15 MG/DL (ref 6–23)
CALCIUM SERPL-MCNC: 9.8 MG/DL (ref 8.6–10.6)
CHLORIDE SERPL-SCNC: 105 MMOL/L (ref 98–107)
CO2 SERPL-SCNC: 29 MMOL/L (ref 21–32)
CREAT SERPL-MCNC: 0.63 MG/DL (ref 0.5–1.05)
CRP SERPL-MCNC: 0.1 MG/DL
EGFRCR SERPLBLD CKD-EPI 2021: >90 ML/MIN/1.73M*2
ERYTHROCYTE [DISTWIDTH] IN BLOOD BY AUTOMATED COUNT: 13.2 % (ref 11.5–14.5)
ERYTHROCYTE [SEDIMENTATION RATE] IN BLOOD BY WESTERGREN METHOD: 18 MM/H (ref 0–30)
GLUCOSE SERPL-MCNC: 118 MG/DL (ref 74–99)
HCT VFR BLD AUTO: 40.6 % (ref 36–46)
HGB BLD-MCNC: 13.1 G/DL (ref 12–16)
MCH RBC QN AUTO: 29.6 PG (ref 26–34)
MCHC RBC AUTO-ENTMCNC: 32.3 G/DL (ref 32–36)
MCV RBC AUTO: 92 FL (ref 80–100)
NRBC BLD-RTO: 0 /100 WBCS (ref 0–0)
PLATELET # BLD AUTO: 284 X10*3/UL (ref 150–450)
POTASSIUM SERPL-SCNC: 4.3 MMOL/L (ref 3.5–5.3)
PROT SERPL-MCNC: 7.4 G/DL (ref 6.4–8.2)
RBC # BLD AUTO: 4.43 X10*6/UL (ref 4–5.2)
SODIUM SERPL-SCNC: 143 MMOL/L (ref 136–145)
WBC # BLD AUTO: 8.1 X10*3/UL (ref 4.4–11.3)

## 2024-12-06 PROCEDURE — 36415 COLL VENOUS BLD VENIPUNCTURE: CPT

## 2024-12-06 PROCEDURE — 80053 COMPREHEN METABOLIC PANEL: CPT

## 2024-12-06 PROCEDURE — 86140 C-REACTIVE PROTEIN: CPT

## 2024-12-06 PROCEDURE — 85652 RBC SED RATE AUTOMATED: CPT

## 2024-12-06 PROCEDURE — 85027 COMPLETE CBC AUTOMATED: CPT

## 2024-12-06 PROCEDURE — 99213 OFFICE O/P EST LOW 20 MIN: CPT | Performed by: INTERNAL MEDICINE

## 2024-12-06 PROCEDURE — 3008F BODY MASS INDEX DOCD: CPT | Performed by: INTERNAL MEDICINE

## 2024-12-06 PROCEDURE — 1036F TOBACCO NON-USER: CPT | Performed by: INTERNAL MEDICINE

## 2024-12-06 RX ORDER — HYDROXYCHLOROQUINE SULFATE 200 MG/1
200 TABLET, FILM COATED ORAL 2 TIMES DAILY
Qty: 180 TABLET | Refills: 0 | Status: SHIPPED | OUTPATIENT
Start: 2024-12-06

## 2024-12-06 ASSESSMENT — PAIN SCALES - GENERAL: PAINLEVEL_OUTOF10: 0-NO PAIN

## 2024-12-06 NOTE — PATIENT INSTRUCTIONS
Discontinue Humira.  Begin abatacept injection every week once approved.  Continue leflunomide and hydroxychloroquine.  Call me if any question.  Follow-up in 4 months.

## 2024-12-06 NOTE — PROGRESS NOTES
"Subjective . Karoline Hung is a 59 y.o. female who presents for Follow-up (Follow up- want to see if she can get off some of the meds ).    HPI.  59-year-old female with history of seronegative inflammatory arthritis, +ve HLA-B27, CAD s/p PCI, sleep GERD, plantar fasciitis, neuropathy and s/p L4-L5 surgery presented for follow-up.     She reports no joint pain, swelling or stiffness.  She has been having scaly erythematous plaques  involving palms, feet, legs and arms for the past 6 months.      Immunosuppression: Hydroxychloroquine, leflunomide and Humira.(1/2024).     Past immunosuppression:  1.  Prednisone.  2.  Methotrexate.  Excessive mucus production.    4/9/2024. RF/ CCP -ve.                 . Hep. B, Hep. C and T-Spot TB -ve.    Skin biopsy: 10/30/2024. 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    Objective     Blood pressure 142/72, pulse 58, height 1.6 m (5' 3\"), weight 85.3 kg (188 lb).    Physical Exam  Gen. AAO x3, NAD.  HEENT: No pallor or icterus, PERRLA, EOMI. Oropharynx is clear. MM moist,Parotid glands  not enlarged. No cervical lymphadenopathy .  Skin: Dry scaly plaques and papules involving the upper and lower extremities.  Large dry scaly areas involving the palms and soles.    Heart: S1, S2/ RRR.   Lungs: CTA B.  Abdomen: Soft, NT/ND.  MSK: Bilateral Heberden's nodes.  No swollen or tender joint.    Neuro:  Sensation to touch intact.Strength 5/5 throughout.   Psych:Appropriate mood and behavior  EXT: No edema    Assessment/Plan     #1: Seronegative inflammatory arthritis.  She is doing well.  -It is possible she has psoriasis secondary to Humira.  We will discontinue it.  -Begin abatacept.  -Continue leflunomide.  -Continue hydroxychloroquine.  Eye exam up to date.  -Labs.    #2: Psoriasis.  Possible due to Humira.  -Humira discontinued.  -She is to follow-up with " dermatology.    Follow-up in 4 months.     This note was partially generated using the Dragon Voice recognition system. There may be some incorrect wording, spelling and/or spelling errors or punctuation errors that were not corrected prior to committing the note to the medical record.      Problem List Items Addressed This Visit    None  Visit Diagnoses       Seronegative rheumatoid arthritis (Multi)    -  Primary    Relevant Medications    abatacept (ORENCIA) 125 mg/mL auto-injector auto-injection    hydroxychloroquine (Plaquenil) 200 mg tablet    Other Relevant Orders    CBC    Comprehensive Metabolic Panel    C-Reactive Protein    Sedimentation Rate                 Active Ambulatory Problems     Diagnosis Date Noted    No Active Ambulatory Problems     Resolved Ambulatory Problems     Diagnosis Date Noted    No Resolved Ambulatory Problems     Past Medical History:   Diagnosis Date    Coronary artery disease     GERD (gastroesophageal reflux disease)     HLA B27 positive     Hyperlipidemia     Hypertension     Personal history of other specified conditions 03/23/2021    Seronegative inflammatory arthritis        Family History   Problem Relation Name Age of Onset    Arthritis Mother      Arthritis Father      Arthritis Maternal Grandmother         No past surgical history on file.    Social History     Tobacco Use   Smoking Status Former    Types: Cigarettes   Smokeless Tobacco Never       Allergies  Sulfamethoxazole, Ticagrelor, Lisinopril, Clonidine, and Sulfa (sulfonamide antibiotics)    Current Meds  Current Outpatient Medications   Medication Instructions    abatacept (ORENCIA) 125 mg, subcutaneous, Every 7 days    albuterol 90 mcg/actuation inhaler 2 puffs, Every 4 hours PRN    alclometasone (Aclovate) 0.05 % cream 2 times daily    aspirin 81 mg, Daily RT    B complex-vitamin C-folic acid (Nephro-Arya Rx) 1- mg-mg-mcg tablet 1 tablet, Every other day    calcium carbonate (Calcium Antacid) 200 mg  calcium chewable tablet Chew.    clobetasol (Temovate) 0.05 % cream Apply to the left foot 2x daily x 2 weeks then 2x daily 2 days per week    dicyclomine (BENTYL) 20 mg, Daily RT    fluticasone (Flonase) 50 mcg/actuation nasal spray 2 sprays, Each Nostril, Daily    glucosamine sulfate 500 mg tablet 1 tablet, Every other day    hydrocortisone (ANUSOL-HC) 25 mg, 2 times daily    hydroxychloroquine (PLAQUENIL) 200 mg, oral, 2 times daily    leflunomide (ARAVA) 20 mg, oral, Daily    loperamide (Imodium) 2 mg capsule One po every day prn diarrhea    losartan (COZAAR) 100 mg, Daily RT    metoprolol succinate XL (TOPROL-XL) 25 mg, Daily RT    nitroglycerin (NITROSTAT) 0.4 mg    omeprazole (PRILOSEC) 20 mg, Daily RT    prasterone (dhea) 50 mg, Every other day    predniSONE (DELTASONE) 20 mg, oral, 2 times daily    rosuvastatin (Crestor) 20 mg tablet 1 tablet, Nightly    turmeric root extract 500 mg capsule Every 48 hours    vitamin E 400 Units, Every other day        Lab Results   Component Value Date    RF <10 04/09/2024    SEDRATE 6 08/09/2024    CRP 0.20 08/09/2024    URICACID 4.2 04/09/2024    CKTOTAL 168 10/04/2024             Nehemiah Ram MD

## 2024-12-09 DIAGNOSIS — M06.00 SERONEGATIVE RHEUMATOID ARTHRITIS (MULTI): ICD-10-CM

## 2024-12-09 RX ORDER — LEFLUNOMIDE 20 MG/1
20 TABLET ORAL DAILY
Qty: 90 TABLET | Refills: 0 | Status: SHIPPED | OUTPATIENT
Start: 2024-12-09 | End: 2025-03-09

## 2024-12-18 ENCOUNTER — TELEPHONE (OUTPATIENT)
Dept: RHEUMATOLOGY | Facility: CLINIC | Age: 59
End: 2024-12-18
Payer: COMMERCIAL

## 2024-12-18 NOTE — TELEPHONE ENCOUNTER
Pts Orencia was denied. Pharmacy called and asked me to send denial letter and clinical notes to them so they can start an appeal. I faxed those over yesterday.

## 2024-12-24 ENCOUNTER — EVALUATION (OUTPATIENT)
Dept: PHYSICAL THERAPY | Facility: HOSPITAL | Age: 59
End: 2024-12-24
Payer: COMMERCIAL

## 2024-12-24 DIAGNOSIS — M25.512 LEFT SHOULDER PAIN, UNSPECIFIED CHRONICITY: ICD-10-CM

## 2024-12-24 PROCEDURE — 97140 MANUAL THERAPY 1/> REGIONS: CPT | Mod: GP | Performed by: PHYSICAL THERAPIST

## 2024-12-24 PROCEDURE — 97161 PT EVAL LOW COMPLEX 20 MIN: CPT | Mod: GP | Performed by: PHYSICAL THERAPIST

## 2024-12-24 ASSESSMENT — PATIENT HEALTH QUESTIONNAIRE - PHQ9
SUM OF ALL RESPONSES TO PHQ9 QUESTIONS 1 AND 2: 0
1. LITTLE INTEREST OR PLEASURE IN DOING THINGS: NOT AT ALL
2. FEELING DOWN, DEPRESSED OR HOPELESS: NOT AT ALL

## 2024-12-24 ASSESSMENT — ENCOUNTER SYMPTOMS
DEPRESSION: 0
OCCASIONAL FEELINGS OF UNSTEADINESS: 0
LOSS OF SENSATION IN FEET: 1

## 2024-12-24 ASSESSMENT — PAIN SCALES - GENERAL: PAINLEVEL_OUTOF10: 3

## 2024-12-24 ASSESSMENT — PAIN - FUNCTIONAL ASSESSMENT: PAIN_FUNCTIONAL_ASSESSMENT: 0-10

## 2024-12-24 NOTE — PROGRESS NOTES
Physical Therapy    Physical Therapy Evaluation    Patient Name: Karoline Hung  MRN: [id number]  Today's Date: 12/24/2024  Time Calculation  Start Time: 0850  Stop Time: 0930  Time Calculation (min): 40 min    PT Evaluation Time Entry  PT Evaluation (Low) Time Entry: 30  PT Therapeutic Procedures Time Entry  Manual Therapy Time Entry: 10                   Visit # 1  Assessment  PT Assessment Results: Decreased range of motion, Decreased strength, Pain  Rehab Prognosis: Good  Evaluation/Treatment Tolerance: Patient tolerated treatment well  Pt. Is a 59 year old female with L scapular area mm. Pain and tenderness to touch with c/o of mm. Knot limiting her reaching down elizondo and pressing to cut food items. Pt. Would benefit from skilled PT to address the above deficits of L shld area.     Plan  Treatment/Interventions: Cryotherapy, Hot pack, Education/ Instruction, Self care/ home management, Therapeutic exercises, Therapeutic activities, Dry needling, Electrical stimulation, Manual therapy, Taping techniques, Ultrasound  PT Plan: Skilled PT  Rehab Potential: Good  Plan of Care Agreement: Patient  1x / wk  or 1x every other week.      Current Problem  1. Left shoulder pain, unspecified chronicity  Referral to Physical Therapy    Follow Up In Physical Therapy          Subjective   Pt. States she is with pain and a knot in her L shld and pain with reaching in cupboard downward, also pressing down to cut things.   Pt. States this began a few months ago, and  has gotten worse. Pt. Has had some jennaton previously in PT for a neck issue and this really helped her and she would like this treatment again.   General:  General  Reason for Referral: intitial eval  Referred By: Dr. Ivan DO  Preferred Learning Style: verbal  Precautions:  Precautions  STEADI Fall Risk Score (The score of 4 or more indicates an increased risk of falling): 2  Medical Precautions:  (L knee pain; heart disease; HTN; RA; back sx; heart stents  2022)       Pain:  Pain Assessment: 0-10  0-10 (Numeric) Pain Score: 3  Pain Type: Acute pain  Pain Location: Shoulder  Pain Orientation: Left  Pain Radiating Towards: above shoulder blade area L -mm knot - per pt.  Home Living:  wnl   Prior Function Per Pt/Caregiver Report:  No working ; Indep ADL's     Objective   Posture:    Pt. Scapular elevates with shld flexion.          Range of Motion:   shld AROM : all wnl except as below:   L Elevation 150  IR: RADHA T12       Strength:  RADHA shld elevation: 4/5              Palpation:  IASTM with mid and upper trap mm. L with swelling and tenderness.      Special Tests:  nt     Gait:. Indep wfl        Other:  Visit 1: Eval and Manual for IASTM 12/26/24    EXERCISES       Date       VISIT# # # # #    REPS REPS REPS REPS   Pulleys   Flex  Scap  IR  L              Tband:       row       Pull down       ER       IR              IASTM       With ice cup                                                                                                                                                   HEP                 Outcome Measures:   Dash: 36.36    OP EDUCATION:  Access Code: [email or web address]  URL: [email or web address]  Date: 12/24/2024  Prepared by: Veena Renner    Exercises  - Seated Shoulder Horizontal Adduction Stretch  - 1-2 x daily - 7 x weekly - 5 sets - 15sec hold  - Seated Scapular Retraction  - 5-7 x daily - 7 x weekly - 10 reps - 3sec hold  Outpatient Education  Individual(s) Educated: Patient  Education Provided: Home Exercise Program, POC  Risk and Benefits Discussed with Patient/Caregiver/Other: yes  Patient/Caregiver Demonstrated Understanding: yes  Plan of Care Discussed and Agreed Upon: yes  Patient Response to Education: Patient/Caregiver Verbalized Understanding of Information    Goals:  Active       L shld pain; weakness L shld       Pt. will reach down in her cupboards without c/o L shld pain.        Start:  12/26/24    Expected End:   02/26/25            Pt. Will be indep with progressive HEP to cont. Progress made in PT.        Start:  12/26/24    Expected End:  03/26/25            Pt. will increase L shld strength / scapular strength to wnl to allow increase use of UE with less pain.        Start:  12/26/24    Expected End:  03/26/25            Pt. will increase scapulohumeral rhythm to wnl to allow decrease L shld blade area pain and tenderness.        Start:  12/26/24    Expected End:  03/26/25

## 2024-12-26 ENCOUNTER — TELEPHONE (OUTPATIENT)
Facility: CLINIC | Age: 59
End: 2024-12-26
Payer: COMMERCIAL

## 2024-12-30 DIAGNOSIS — R19.7 DIARRHEA, UNSPECIFIED TYPE: ICD-10-CM

## 2024-12-31 ENCOUNTER — TREATMENT (OUTPATIENT)
Dept: PHYSICAL THERAPY | Facility: HOSPITAL | Age: 59
End: 2024-12-31
Payer: COMMERCIAL

## 2024-12-31 DIAGNOSIS — M25.512 LEFT SHOULDER PAIN, UNSPECIFIED CHRONICITY: ICD-10-CM

## 2024-12-31 PROCEDURE — 97110 THERAPEUTIC EXERCISES: CPT | Mod: GP | Performed by: PHYSICAL THERAPIST

## 2024-12-31 PROCEDURE — 97140 MANUAL THERAPY 1/> REGIONS: CPT | Mod: GP | Performed by: PHYSICAL THERAPIST

## 2024-12-31 ASSESSMENT — PAIN - FUNCTIONAL ASSESSMENT: PAIN_FUNCTIONAL_ASSESSMENT: 0-10

## 2024-12-31 ASSESSMENT — PAIN SCALES - GENERAL: PAINLEVEL_OUTOF10: 4

## 2024-12-31 NOTE — PROGRESS NOTES
Physical Therapy    Physical Therapy Treatment    Patient Name: Karoline Hung  MRN: [id number]  Today's Date: 2024  Time Calculation  Start Time: 1020  Stop Time: 1100  Time Calculation (min): 40 min    : 1965     PT Therapeutic Procedures Time Entry  Manual Therapy Time Entry: 15  Therapeutic Exercise Time Entry: 25           Visit: # 2      Assessment:  Pt. Is with 60% decrease in swelling and resistance with IASTM.          Plan:   Cont. Increase strength , and shld ROM, IASTM to decrease pain upper trap mm. area    Current Problem  1. Left shoulder pain, unspecified chronicity  Follow Up In Physical Therapy          Subjective   Pt. States she is with benefit L shld blade area after IASTM last session (Eval). Pt. States she is with cont. Increase pain again as she was cont. Doing things at water aerobics and sweeping her floor.         Pain  Pain Assessment: 0-10  0-10 (Numeric) Pain Score: 4  Pain Type: Acute pain  Pain Location: Shoulder  Pain Orientation: Left  Pain Radiating Towards: above shoulder blade area L - mm. knot returned    Objective   Increased there Ex.   Visit 1: Eval and Manual for IASTM 24    EXERCISES       Date 24      VISIT# #2 # # #    REPS REPS REPS REPS   Pulleys   Flex  Scap  IR  L   3'  3'  3'             Tband:       row Red 10x2      Pull down Red 10x2      ER L Red 10x2      IR L Red 10x2             IASTM 15'      With ice cup x                                                                                                                                                  HEP                         OP EDUCATION:       Goals:  Active       L shld pain; weakness L shld       Pt. will reach down in her cupboards without c/o L shld pain.  (Progressing)       Start:  24    Expected End:  25            Pt. Will be indep with progressive HEP to cont. Progress made in PT.        Start:  24    Expected End:  25            Pt. will  increase L shld strength / scapular strength to wnl to allow increase use of UE with less pain.        Start:  12/26/24    Expected End:  03/26/25            Pt. will increase scapulohumeral rhythm to wnl to allow decrease L shld blade area pain and tenderness.        Start:  12/26/24    Expected End:  03/26/25

## 2025-01-01 RX ORDER — LOPERAMIDE HYDROCHLORIDE 2 MG/1
CAPSULE ORAL
Qty: 30 CAPSULE | Refills: 0 | Status: SHIPPED | OUTPATIENT
Start: 2025-01-01

## 2025-01-02 ENCOUNTER — APPOINTMENT (OUTPATIENT)
Dept: PHYSICAL THERAPY | Facility: CLINIC | Age: 60
End: 2025-01-02
Payer: COMMERCIAL

## 2025-01-03 ENCOUNTER — TELEPHONE (OUTPATIENT)
Dept: RHEUMATOLOGY | Facility: CLINIC | Age: 60
End: 2025-01-03
Payer: COMMERCIAL

## 2025-01-03 DIAGNOSIS — R21 RASH: Primary | ICD-10-CM

## 2025-01-03 NOTE — TELEPHONE ENCOUNTER
The rash spots are still all over the body, has been using the allergy medication, and doing a allergy cream as well, but feels like its not get much better,

## 2025-01-06 ENCOUNTER — TELEPHONE (OUTPATIENT)
Dept: PRIMARY CARE | Facility: CLINIC | Age: 60
End: 2025-01-06

## 2025-01-07 ENCOUNTER — TELEPHONE (OUTPATIENT)
Dept: PRIMARY CARE | Facility: CLINIC | Age: 60
End: 2025-01-07
Payer: COMMERCIAL

## 2025-01-07 NOTE — TELEPHONE ENCOUNTER
Pt was seen at [facility] today and presents with a rash. Esperanza Waldrop, the dermatologist, would like you to call her to discuss starting the pt on Tremfya. Her number is [phone]. She asked for you specifically.

## 2025-01-08 ENCOUNTER — TREATMENT (OUTPATIENT)
Dept: PHYSICAL THERAPY | Facility: HOSPITAL | Age: 60
End: 2025-01-08
Payer: COMMERCIAL

## 2025-01-08 DIAGNOSIS — M25.512 LEFT SHOULDER PAIN, UNSPECIFIED CHRONICITY: ICD-10-CM

## 2025-01-08 PROCEDURE — 97140 MANUAL THERAPY 1/> REGIONS: CPT | Mod: GP,CQ

## 2025-01-08 PROCEDURE — 97110 THERAPEUTIC EXERCISES: CPT | Mod: GP,CQ

## 2025-01-08 NOTE — PROGRESS NOTES
'Physical Therapy    Physical Therapy Treatment    Patient Name: Karoline Hung  MRN: [id number]  : 1965   Today's Date: 2025  Time Calculation  Start Time: 100  Stop Time: 014  Time Calculation (min): 45 min  Visit #3    PT Therapeutic Procedures Time Entry  Manual Therapy Time Entry: 15  Therapeutic Exercise Time Entry: 25     Non-Billable Time  Non-billable time: 5    Current Problem  1. Left shoulder pain, unspecified chronicity  Follow Up In Physical Therapy            Subjective   Pt states she did water aerobics this morning and is a little sore.        Precautions   STEADI Fall Risk Score (The score of 4 or more indicates an increased risk of falling): 2  Medical Precautions:  (L knee pain; heart disease; HTN; RA; back sx; heart stents )       Pain   4/10    Objective    See flow sheet       Treatments:  Visit 1: Eval and Manual for IASTM 24    EXERCISES       Date 24     VISIT# #2 #3 # #    REPS REPS REPS REPS   Pulleys   Flex  Scap  IR  L   3'  3'  3'   3'  3'  3'            Tband:       row Red 10x2 Red 10x2     Pull down Red 10x2 Red 10x2     ER L Red 10x2 Red 10x2     IR L Red 10x2 Red 10x2            IASTM 15' 15'     With ice cup x X                                                                                                                                                 HEP            Assessment:  Pt reports she will try and find her pulleys at home to use. Pt reports some discomfort with instrumented assist STM over knot. Pt states she does feel better afterwards.        Plan:   Cont. Increase strength , and shld ROM, IASTM to decrease pain upper trap mm. area     OP EDUCATION:       Goals:  Active       L shld pain; weakness L shld       Pt. will reach down in her cupboards without c/o L shld pain.  (Progressing)       Start:  24    Expected End:  25            Pt. Will be indep with progressive HEP to cont. Progress made in PT.        Start:   12/26/24    Expected End:  03/26/25            Pt. will increase L shld strength / scapular strength to wnl to allow increase use of UE with less pain.        Start:  12/26/24    Expected End:  03/26/25            Pt. will increase scapulohumeral rhythm to wnl to allow decrease L shld blade area pain and tenderness.        Start:  12/26/24    Expected End:  03/26/25

## 2025-01-09 ENCOUNTER — TELEPHONE (OUTPATIENT)
Dept: RHEUMATOLOGY | Facility: CLINIC | Age: 60
End: 2025-01-09
Payer: COMMERCIAL

## 2025-01-09 NOTE — TELEPHONE ENCOUNTER
Patient called office asking for me to call her back.. I did call and lvm for patient letter her know ill try gain later

## 2025-01-15 ENCOUNTER — APPOINTMENT (OUTPATIENT)
Dept: PHYSICAL THERAPY | Facility: HOSPITAL | Age: 60
End: 2025-01-15
Payer: COMMERCIAL

## 2025-01-28 DIAGNOSIS — L50.0 ALLERGIC URTICARIA: Primary | ICD-10-CM

## 2025-02-06 ENCOUNTER — TELEPHONE (OUTPATIENT)
Dept: PRIMARY CARE | Facility: CLINIC | Age: 60
End: 2025-02-06

## 2025-02-06 ENCOUNTER — TREATMENT (OUTPATIENT)
Dept: PHYSICAL THERAPY | Facility: HOSPITAL | Age: 60
End: 2025-02-06
Payer: COMMERCIAL

## 2025-02-06 DIAGNOSIS — M25.512 LEFT SHOULDER PAIN, UNSPECIFIED CHRONICITY: ICD-10-CM

## 2025-02-06 PROCEDURE — 97110 THERAPEUTIC EXERCISES: CPT | Mod: GP | Performed by: PHYSICAL THERAPIST

## 2025-02-06 PROCEDURE — 97140 MANUAL THERAPY 1/> REGIONS: CPT | Mod: GP | Performed by: PHYSICAL THERAPIST

## 2025-02-06 ASSESSMENT — PAIN - FUNCTIONAL ASSESSMENT: PAIN_FUNCTIONAL_ASSESSMENT: 0-10

## 2025-02-06 ASSESSMENT — PAIN SCALES - GENERAL: PAINLEVEL_OUTOF10: 2

## 2025-02-06 NOTE — PROGRESS NOTES
Physical Therapy    Physical Therapy Treatment  / recheck    Patient Name: Karoline Hung  MRN: [id number]  Today's Date: 2/6/2025  Time Calculation  Start Time: 1305 (pt. late)  Stop Time: 1345  Time Calculation (min): 40 min    Dob1965     PT Therapeutic Procedures Time Entry  Manual Therapy Time Entry: 10  Therapeutic Exercise Time Entry: 30           Visit: # 4      Assessment:  Pt. Is with increased ROM L shld and increased strength. Pt. Cont. With pain and TTT mid and upper trap mm. Pt. Would benefit cont. Skilled PT to decrease pain L upper trap mm area.          Plan:  Cont. Skilled PT to decrease pain and increase function.      Current Problem  1. Left shoulder pain, unspecified chronicity  Follow Up In Physical Therapy          Subjective   Pt. States she has not been in for a while due to financial concerns, and psoriasis flare up. Pt. States she is feeling better with her L shld.   Pt. With cont. L mm. Knot area in back of shoulder area. Pt. Is cont with pain in this area with getting items out of bottom cupboard and plugging items in.         Pain  Pain Assessment: 0-10  0-10 (Numeric) Pain Score: 2  Pain Type: Acute pain  Pain Location: Shoulder  Pain Orientation: Left  Pain Radiating Towards: above shld blade area L -mm. knot per pt.    Objective   Posture:    Pt. Scapular elevates with shld flexion.          Range of Motion:   shld AROM : all wnl except as below:   L Elevation 165  IR: RADHA T9       Strength:  RADHA shld elevation: 5/5              Palpation:  IASTM with mid and upper trap mm. L with swelling and tenderness.      Special Tests:  nt     Gait:. Indep wfl   Visit 1: Eval and Manual for IASTM 12/26/24    EXERCISES       Date 12/31/24 1/8/25 2/6/25    VISIT# #2 #3 #4 #    REPS REPS REPS REPS   Pulleys   Flex  Scap  IR  L   3'  3'  3'   3'  3'  3'   3'  3'  3'           Tband:       row Red 10x2 Red 10x2 Red 15x2    Pull down Red 10x2 Red 10x2 Red 15x2    ER L Red 10x2 Red 10x2 Red  15x2    IR L Red 10x2 Red 10x2 Red 15x2           IASTM 15' 15' 10    With ice cup x X                                                                                                                                                 HEP                       OP EDUCATION:  Access Code: [email or web address]  URL: [email or web address]  Date: 02/06/2025  Prepared by: Veena Renner    Exercises  - Standing Shoulder Row with Anchored Resistance  - 1 x daily - 7 x weekly - 3 sets - 10 reps  - Shoulder extension with resistance - Neutral  - 1 x daily - 7 x weekly - 3 sets - 10 reps  - Shoulder Internal Rotation with Resistance  - 1 x daily - 7 x weekly - 3 sets - 10 reps  - Shoulder External Rotation with Anchored Resistance  - 1 x daily - 7 x weekly - 3 sets - 10 reps     Goals:  Active       L shld pain; weakness L shld       Pt. will reach down in her cupboards without c/o L shld pain.  (Progressing)       Start:  12/26/24    Expected End:  02/26/25            Pt. Will be indep with progressive HEP to cont. Progress made in PT.  (Progressing)       Start:  12/26/24    Expected End:  03/26/25            Pt. will increase L shld strength / scapular strength to wnl to allow increase use of UE with less pain.        Start:  12/26/24    Expected End:  03/26/25            Pt. will increase scapulohumeral rhythm to wnl to allow decrease L shld blade area pain and tenderness.        Start:  12/26/24    Expected End:  03/26/25

## 2025-02-13 ENCOUNTER — APPOINTMENT (OUTPATIENT)
Dept: PHYSICAL THERAPY | Facility: HOSPITAL | Age: 60
End: 2025-02-13
Payer: COMMERCIAL

## 2025-02-19 ENCOUNTER — TREATMENT (OUTPATIENT)
Dept: PHYSICAL THERAPY | Facility: HOSPITAL | Age: 60
End: 2025-02-19
Payer: COMMERCIAL

## 2025-02-19 DIAGNOSIS — M25.512 LEFT SHOULDER PAIN, UNSPECIFIED CHRONICITY: ICD-10-CM

## 2025-02-19 PROCEDURE — 97140 MANUAL THERAPY 1/> REGIONS: CPT | Mod: GP,CQ

## 2025-02-19 PROCEDURE — 97110 THERAPEUTIC EXERCISES: CPT | Mod: GP,CQ

## 2025-02-19 NOTE — PROGRESS NOTES
Physical Therapy    Physical Therapy Treatment    Patient Name: Karoline Hung  MRN: [id number]  : 1965   Today's Date: 2025  Time Calculation  Start Time: 010  Stop Time: 014  Time Calculation (min): 45 min  Visit #5    PT Therapeutic Procedures Time Entry  Manual Therapy Time Entry: 10  Therapeutic Exercise Time Entry: 30     Non-Billable Time  Non-billable time: 5    Current Problem  1. Left shoulder pain, unspecified chronicity  Follow Up In Physical Therapy            Subjective   Pt sates she had some pain last night in shld, stating its only when she moves certain ways. Currently no pain.        Precautions    STEADI Fall Risk Score (The score of 4 or more indicates an increased risk of falling): 2  Medical Precautions:  (L knee pain; heart disease; HTN; RA; back sx; heart stents )          Pain   0/10    Objective    See flow sheet       Treatments:  EXERCISES       Date 24   VISIT# #2 #3 #4 #5    REPS REPS REPS REPS   Pulleys   Flex  Scap  IR  L   3'  3'  3'   3'  3'  3'   3'  3'  3'   3'  3'  3'          Tband:       row Red 10x2 Red 10x2 Red 15x2 Green 2 x 15   Pull down Red 10x2 Red 10x2 Red 15x2 Green 2 x 15   ER L Red 10x2 Red 10x2 Red 15x2 Green 2 x 15   IR L Red 10x2 Red 10x2 Red 15x2 Green 2 x 15          IASTM 15' 15' 10 10'   With ice cup x X                                                                                                                                                 HEP        Access Code: [email or web address]  URL: [email or web address]  Date: 2025  Prepared by: Veena Renner     Exercises  - Standing Shoulder Row with Anchored Resistance  - 1 x daily - 7 x weekly - 3 sets - 10 reps  - Shoulder extension with resistance - Neutral  - 1 x daily - 7 x weekly - 3 sets - 10 reps  - Shoulder Internal Rotation with Resistance  - 1 x daily - 7 x weekly - 3 sets - 10 reps  - Shoulder External Rotation with Anchored  Resistance  - 1 x daily - 7 x weekly - 3 sets - 10 reps      Assessment:  Cues on technique with thera band exercises. Pt tolerated increased resistance on bands with no increased pain. Pt states IASTM helped loosen muscles in shld.        Plan:   Cont. Skilled PT to decrease pain and increase function.      OP EDUCATION:       Goals:  Active       L shld pain; weakness L shld       Pt. will reach down in her cupboards without c/o L shld pain.  (Progressing)       Start:  12/26/24    Expected End:  02/26/25            Pt. Will be indep with progressive HEP to cont. Progress made in PT.  (Progressing)       Start:  12/26/24    Expected End:  03/26/25            Pt. will increase L shld strength / scapular strength to wnl to allow increase use of UE with less pain.        Start:  12/26/24    Expected End:  03/26/25            Pt. will increase scapulohumeral rhythm to wnl to allow decrease L shld blade area pain and tenderness.        Start:  12/26/24    Expected End:  03/26/25

## 2025-03-05 DIAGNOSIS — M06.00 SERONEGATIVE RHEUMATOID ARTHRITIS (MULTI): ICD-10-CM

## 2025-03-06 ENCOUNTER — TREATMENT (OUTPATIENT)
Dept: PHYSICAL THERAPY | Facility: HOSPITAL | Age: 60
End: 2025-03-06
Payer: COMMERCIAL

## 2025-03-06 DIAGNOSIS — M25.512 LEFT SHOULDER PAIN, UNSPECIFIED CHRONICITY: ICD-10-CM

## 2025-03-06 PROCEDURE — 97110 THERAPEUTIC EXERCISES: CPT | Mod: GP | Performed by: PHYSICAL THERAPIST

## 2025-03-06 PROCEDURE — 97140 MANUAL THERAPY 1/> REGIONS: CPT | Mod: GP | Performed by: PHYSICAL THERAPIST

## 2025-03-06 RX ORDER — HYDROXYCHLOROQUINE SULFATE 200 MG/1
TABLET, FILM COATED ORAL DAILY
Qty: 180 TABLET | Refills: 0 | Status: SHIPPED | OUTPATIENT
Start: 2025-03-06

## 2025-03-06 ASSESSMENT — PAIN SCALES - GENERAL: PAINLEVEL_OUTOF10: 0 - NO PAIN

## 2025-03-06 ASSESSMENT — PAIN - FUNCTIONAL ASSESSMENT: PAIN_FUNCTIONAL_ASSESSMENT: 0-10

## 2025-03-06 NOTE — PROGRESS NOTES
Physical Therapy    Physical Therapy Treatment / discharge    Patient Name: Karoline Hung  MRN: [id number]  Today's Date: 3/6/2025  Time Calculation  Start Time: 1305 (pt. late)  Stop Time: 1350  Time Calculation (min): 45 min    : 1965     PT Therapeutic Procedures Time Entry  Manual Therapy Time Entry: 20  Therapeutic Exercise Time Entry: 25           Visit: # 6      Assessment:  Pt. Has progressed to wnl L shld AROM and strength. Pt. Cont. With tenderness L top of shoulder.          Plan:  Discharge PT to indep HEP.      Current Problem  1. Left shoulder pain, unspecified chronicity  Follow Up In Physical Therapy          Subjective   Pt. States she is without pain with swim aerobics. She did swim aerobics for hour and half today.         Pain  Pain Assessment: 0-10  0-10 (Numeric) Pain Score: 0 - No pain  Pain Type: Acute pain  Pain Location: Shoulder  Pain Orientation: Left    Objective     Posture:    Pt. Scapular elevates with shld flexion.          Range of Motion:   shld AROM : all wnl except as below:   L Elevation 165  IR: RADHA T9       Strength:  RADHA shld elevation: 5-/5              Palpation:  IASTM with mid and upper trap mm. L with swelling and tenderness - mild.      Special Tests:  nt     Gait:. Indep wfl             Treatments:  EXERCISES     recheck   Date 12/31/24 1/8/25 2/6/25 2/19/25 3/6/25   VISIT# #2 #3 #4 #5 #6    REPS REPS REPS REPS    Pulleys   Flex  Scap  IR  L   3'  3'  3'   3'  3'  3'   3'  3'  3'   3'  3'  3'   3'  3'  N/t reviewed for home           Tband:        row Red 10x2 Red 10x2 Red 15x2 Green 2 x 15 Reviewed for home - all   Pull down Red 10x2 Red 10x2 Red 15x2 Green 2 x 15    ER L Red 10x2 Red 10x2 Red 15x2 Green 2 x 15    IR L Red 10x2 Red 10x2 Red 15x2 Green 2 x 15            IASTM 15' 15' 10 10' 20   With ice cup x X                                              HEP              OP EDUCATION:  Access Code: [email or web address]  URL:  [email or web address]  Date: 03/06/2025  Prepared by: Veena Renner    Exercises  - Standing Shoulder Internal Rotation Stretch with Towel  - 1 x daily - 7 x weekly - 1 sets - 10 reps - 5sec hold     Goals:  Active       L shld pain; weakness L shld       Pt. will reach down in her cupboards without c/o L shld pain.  (Progressing)       Start:  12/26/24    Expected End:  02/26/25            Pt. Will be indep with progressive HEP to cont. Progress made in PT.  (Met)       Start:  12/26/24    Expected End:  03/26/25    Resolved:  03/06/25         Pt. will increase L shld strength / scapular strength to wnl to allow increase use of UE with less pain.  (Met)       Start:  12/26/24    Expected End:  03/26/25    Resolved:  03/06/25         Pt. will increase scapulohumeral rhythm to wnl to allow decrease L shld blade area pain and tenderness.  (Met)       Start:  12/26/24    Expected End:  03/26/25    Resolved:  03/06/25

## 2025-03-19 DIAGNOSIS — M06.00 SERONEGATIVE RHEUMATOID ARTHRITIS (MULTI): ICD-10-CM

## 2025-03-20 RX ORDER — LEFLUNOMIDE 20 MG/1
20 TABLET ORAL DAILY
Qty: 90 TABLET | Refills: 1 | Status: SHIPPED | OUTPATIENT
Start: 2025-03-20

## 2025-03-22 RX ORDER — HYDROXYCHLOROQUINE SULFATE 200 MG/1
TABLET, FILM COATED ORAL
Qty: 180 TABLET | Refills: 0 | Status: SHIPPED | OUTPATIENT
Start: 2025-03-22

## 2025-03-22 RX ORDER — LEFLUNOMIDE 20 MG/1
20 TABLET ORAL DAILY
Qty: 90 TABLET | Refills: 0 | Status: SHIPPED | OUTPATIENT
Start: 2025-03-22 | End: 2025-06-20

## 2025-03-25 ENCOUNTER — TELEPHONE (OUTPATIENT)
Dept: RHEUMATOLOGY | Facility: CLINIC | Age: 60
End: 2025-03-25
Payer: COMMERCIAL

## 2025-03-25 NOTE — TELEPHONE ENCOUNTER
Called patient back.  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  And did not help her skin issues.  Was on Tremfya and it became worse.  She is currently on Spevigo and it does seem to be clearing now.  She is currently on leflunomide.  She had side effects with methotrexate of increased mucus and is still on hydroxychloroquine.    Unfortunately cannot add to Biologics together.  Would want to try a different 1 such as infliximab or Cosentyx.  Uncertain if Cosentyx would work as when she was on Tremfya as she had worsening of her pustular psoriasis.  She will talk to her dermatologist concerning this.  Told her if she wants an appointment closer to schedule with our [place] office.

## 2025-03-25 NOTE — TELEPHONE ENCOUNTER
Pt is an [facility] pt and is scheduled for I think August. She states she is experiencing trouble walking and standing up. Pt wants a call back.

## 2025-04-01 DIAGNOSIS — R19.7 DIARRHEA, UNSPECIFIED TYPE: ICD-10-CM

## 2025-04-04 RX ORDER — LOPERAMIDE HYDROCHLORIDE 2 MG/1
CAPSULE ORAL
Qty: 30 CAPSULE | Refills: 0 | Status: SHIPPED | OUTPATIENT
Start: 2025-04-04

## 2025-04-11 ENCOUNTER — APPOINTMENT (OUTPATIENT)
Dept: RHEUMATOLOGY | Facility: CLINIC | Age: 60
End: 2025-04-11
Payer: COMMERCIAL

## 2025-04-17 ENCOUNTER — OFFICE VISIT (OUTPATIENT)
Dept: URGENT CARE | Age: 60
End: 2025-04-17
Payer: COMMERCIAL

## 2025-04-17 VITALS
OXYGEN SATURATION: 98 % | RESPIRATION RATE: 18 BRPM | WEIGHT: 185 LBS | BODY MASS INDEX: 32.77 KG/M2 | HEART RATE: 98 BPM | DIASTOLIC BLOOD PRESSURE: 80 MMHG | SYSTOLIC BLOOD PRESSURE: 130 MMHG | TEMPERATURE: 98.2 F

## 2025-04-17 DIAGNOSIS — M25.562 ACUTE PAIN OF LEFT KNEE: Primary | ICD-10-CM

## 2025-04-17 ASSESSMENT — ENCOUNTER SYMPTOMS
DEPRESSION: 0
OCCASIONAL FEELINGS OF UNSTEADINESS: 0
CONSTITUTIONAL NEGATIVE: 1
LOSS OF SENSATION IN FEET: 0
ARTHRALGIAS: 1

## 2025-04-17 NOTE — PROGRESS NOTES
Subjective   Patient ID: Karoline Hung is a 59 y.o. female. They present today with a chief complaint of Knee Pain (Left knee swollen x 2.5 month).    History of Present Illness  59-year-old female presents to clinic today with complaints of left knee pain.  Patient states that it has been on and off pain for couple months but worsened today.  She states she walked 2 acres of her yard spraying weeds.  She states has become slightly swollen.  The pain is on the inner aspect of the knee.  Denies locking clicking or popping.  Denies a sensation that the knee will give out on her.  She took some Aleve today.  She does have a history of RA.      Knee Pain         Past Medical History  Allergies as of 04/17/2025 - Reviewed 04/17/2025   Allergen Reaction Noted    Sulfamethoxazole Other 03/17/2022    Ticagrelor Shortness of breath 03/01/2023    Lisinopril Cough 07/15/2022    Clonidine Itching 03/01/2023    Sulfa (sulfonamide antibiotics) Rash 04/09/2024       Prescriptions Prior to Admission[1]     Medical History[2]    Surgical History[3]     reports that she has quit smoking. Her smoking use included cigarettes. She has never used smokeless tobacco. She reports current alcohol use. She reports that she does not use drugs.    Review of Systems  Review of Systems   Constitutional: Negative.    Musculoskeletal:  Positive for arthralgias.                                  Objective    Vitals:    04/17/25 1951   BP: 130/80   Pulse: 98   Resp: 18   Temp: 36.8 °C (98.2 °F)   TempSrc: Oral   SpO2: 98%   Weight: 83.9 kg (185 lb)     No LMP recorded (lmp unknown). (Menstrual status: Menopausal).    Physical Exam  Constitutional:       Appearance: Normal appearance.   Musculoskeletal:      Left knee: Tenderness present over the medial joint line. No LCL laxity, MCL laxity, ACL laxity or PCL laxity.Abnormal meniscus. Normal pulse.      Instability Tests: Medial Zeus test positive.   Neurological:      Mental Status: She is  alert.         Procedures    Point of Care Test & Imaging Results from this visit  No results found for this visit on 04/17/25.   Imaging  No results found.    Cardiology, Vascular, and Other Imaging  No other imaging results found for the past 2 days      Diagnostic study results (if any) were reviewed by Felix Boone PA-C.    Assessment/Plan   Allergies, medications, history, and pertinent labs/EKGs/Imaging reviewed by Felix Boone PA-C.     Medical Decision Making  Patient pleasant cooperative on examination.    On examination there is medial joint line tenderness.  There is some discomfort with medial Zeus.    There is no laxity noted to the ligaments.    Discussed with patient that this may be overuse versus arthritis versus meniscal problem.  With her history of RA it is likely she does have arthritis.    I advised that we can take an x-ray however that does not necessarily change the plan of care at this time.  Patient states she cannot tolerate steroids.  I advised her that she may take Tylenol and ibuprofen for discomfort.  RICE protocol and to follow-up with orthopedic referral that I provided her with.    Patient agreement with plan.    Orders and Diagnoses  There are no diagnoses linked to this encounter.    Medical Admin Record      Patient disposition: Home    Electronically signed by Felix Boone PA-C  8:10 PM           [1] (Not in a hospital admission)  [2]   Past Medical History:  Diagnosis Date    Coronary artery disease     GERD (gastroesophageal reflux disease)     HLA B27 positive     Hyperlipidemia     Hypertension     Personal history of other specified conditions 03/23/2021    History of multiple pulmonary nodules    Seronegative inflammatory arthritis    [3] No past surgical history on file.

## 2025-04-18 NOTE — PATIENT INSTRUCTIONS
Rest affected extremity.  Ice injured area 20 min on, 20 off and repeat. ACE wrap or knee sleeve.  Elevate.     Do the above for the next 1-2 days.     Warm compresses, stretching after 2 days     Ibuprofen and/or Tylenol for pain and inflammation.     Please follow up with PCP or Ortho if symptoms persist      Ortho [phone]

## 2025-04-24 ENCOUNTER — APPOINTMENT (OUTPATIENT)
Dept: PRIMARY CARE | Facility: CLINIC | Age: 60
End: 2025-04-24
Payer: COMMERCIAL

## 2025-04-25 ENCOUNTER — APPOINTMENT (OUTPATIENT)
Dept: PRIMARY CARE | Facility: CLINIC | Age: 60
End: 2025-04-25
Payer: COMMERCIAL

## 2025-04-29 ENCOUNTER — HOSPITAL ENCOUNTER (OUTPATIENT)
Dept: RADIOLOGY | Facility: HOSPITAL | Age: 60
Discharge: HOME | End: 2025-04-29
Payer: COMMERCIAL

## 2025-04-29 ENCOUNTER — APPOINTMENT (OUTPATIENT)
Dept: ORTHOPEDIC SURGERY | Facility: CLINIC | Age: 60
End: 2025-04-29
Payer: COMMERCIAL

## 2025-04-29 DIAGNOSIS — M25.562 LEFT KNEE PAIN, UNSPECIFIED CHRONICITY: Primary | ICD-10-CM

## 2025-04-29 DIAGNOSIS — M25.562 LEFT KNEE PAIN, UNSPECIFIED CHRONICITY: ICD-10-CM

## 2025-04-29 PROCEDURE — 73562 X-RAY EXAM OF KNEE 3: CPT | Mod: RT

## 2025-04-29 PROCEDURE — 73564 X-RAY EXAM KNEE 4 OR MORE: CPT | Mod: LEFT SIDE | Performed by: STUDENT IN AN ORGANIZED HEALTH CARE EDUCATION/TRAINING PROGRAM

## 2025-04-29 PROCEDURE — 1036F TOBACCO NON-USER: CPT | Performed by: ORTHOPAEDIC SURGERY

## 2025-04-29 PROCEDURE — 73564 X-RAY EXAM KNEE 4 OR MORE: CPT | Mod: LT

## 2025-04-29 PROCEDURE — 99204 OFFICE O/P NEW MOD 45 MIN: CPT | Performed by: ORTHOPAEDIC SURGERY

## 2025-04-29 ASSESSMENT — PAIN SCALES - GENERAL: PAINLEVEL_OUTOF10: 7

## 2025-04-29 ASSESSMENT — PAIN - FUNCTIONAL ASSESSMENT: PAIN_FUNCTIONAL_ASSESSMENT: 0-10

## 2025-04-29 NOTE — PROGRESS NOTES
This is a consultation from Dr. Victorino Love MD for   Chief Complaint   Patient presents with    Left Knee - Pain       This is a 59 y.o. female who presents for evaluation of left knee pain.  Patient states she has had left knee pain for several months this is new issue.  She points to sharp pain over the anterior medial knee worse with walking.  It has been associated with swelling.  She has a history of rheumatoid arthritis and psoriasis.  She took naproxen which is helping.  She has not done therapy.  She has used a brace which is helpful.  She has never had injections or surgery.    Physical Exam    There has been no interval change in this patient's past medical, surgical, medications, allergies, family history or social history since the most recent visit to a provider within our department. 14 point review of systems was performed, reviewed, and negative except for pertinent positives documented in the history of present illness.     Constitutional: well developed, well nourished female in no acute distress  Psychiatric: normal mood, appropriate affect  Eyes: sclera anicteric  HENT: normocephalic/atraumatic  CV: regular rate and rhythm   Respiratory: non labored breathing  Integumentary: no rash  Neurological: moves all extremities    Left knee exam: skin intact no lacerations or abrations.  1+ effusion.  Tender medial joint line. negative log roll negative patellar grind. ROM 0-120. stable to varus and valgus stress at 0 and 30 degrees. negative lachman negative posterior drawer negative mitchel. 5/5 ehl/fhl/gs/ta. silt s/s/sp/dp/t. 2+ dp/pt        Imaging:  Xrays were ordered by me, they were reviewed and independently interpreted by me today, they show minimal degenerative changes in the left knee, well-maintained joint space.    Procedures      Impression/Plan: This is a 59 y.o. female with mild arthritis of the left knee.  Associated with rheumatoid arthritis.  I had an in depth discussion with the  "patient regarding treatment options for arthritis and their relative risks and benefits. We reviewed surgical and nonsurgical option for treatment. Treatments include anti inflammatory medications, physical therapy, weight loss, activity modification, use of assistive devices, injection therapies. We discussed current prescriptions and risks and benefits of continuation of prescription medication as apporpriate. We discussed that arthritis is often progressive over time, an in end stage arthritis surgical interventions can be considered, including arthroplasty. All questions were answered and the patient voiced their understanding.  Discussed cortisone injection, patient defers for today.  She will continue using over-the-counter anti-inflammatories, we recommended physical therapy.  I will see her back to continue following this.    BMI Readings from Last 1 Encounters:   04/17/25 32.77 kg/m²      Lab Results   Component Value Date    CREATININE 0.63 12/06/2024     Tobacco Use: Medium Risk (4/29/2025)    Patient History     Smoking Tobacco Use: Former     Smokeless Tobacco Use: Never     Passive Exposure: Not on file      Computed MELD 3.0 unavailable. One or more values for this score either were not found within the given timeframe or did not fit some other criterion.  Computed MELD-Na unavailable. One or more values for this score either were not found within the given timeframe or did not fit some other criterion.       Lab Results   Component Value Date    HGBA1C 6.0 (H) 07/06/2022     No results found for: \"STAPHMRSASCR\"  "

## 2025-05-13 ENCOUNTER — TELEPHONE (OUTPATIENT)
Dept: ORTHOPEDIC SURGERY | Facility: CLINIC | Age: 60
End: 2025-05-13

## 2025-05-13 DIAGNOSIS — M25.562 LEFT KNEE PAIN, UNSPECIFIED CHRONICITY: ICD-10-CM

## 2025-05-13 NOTE — TELEPHONE ENCOUNTER
Patient called and stated that she was referred to PT for lt knee but is in pain to the point she does not want to do it. She stated she discussed an MRI with Dr. Hendricks and wanted to get one done.  She is out of town today and asked to be called back at [phone], just for today 5/13/25.

## 2025-05-14 NOTE — TELEPHONE ENCOUNTER
Patient called again about having pain inand around her left knee to the extent that she cannot put any weight on it or do any walking.  She said the pan and swelling are worse since her last appointment on 4/29. She continues to wear the brace and uses ice.  She is taking 2 Aleve tablets 2x a day and that is not helping the pain any more.    I explained to her that a MRI order was being put in but it would take some time to get authorization from insurance.  She stated she needed to do something because she just can't walk.    I told the patient that I would send this message and Dr. Hendricks's MA or I would get back with her today.

## 2025-05-22 NOTE — TELEPHONE ENCOUNTER
04/29/25 lt knee pain  Patient called and is having trouble walking, in a great deal of pain. She is not scheduled until 5/30/25 for her MRI as they have to wait for authorization. Patient is requesting the MRI order be placed as STAT. I let patient know if there is nothing that warrants it being placed as STAT we most likely are unable to do that. Told her I would ask.

## 2025-05-26 NOTE — PROGRESS NOTES
Subjective  . Karoline Hung is a 58 y.o. female who presents for New Patient Visit (Referred from another provider).    HPI.  58-year-old female with history of seronegative inflammatory arthritis, HLA-B27 positive, CAD s/p PCI, sleep GERD, plantar fasciitis, neuropathy and s/p L4-L5 surgery presented to [facility].    He presented to rheumatologist Dr. David Harris about 15 years ago with sudden onset of pain in multiple joints and difficulty of raising the arm above the head.  She was diagnosed with seronegative inflammatory arthritis.  She was treated with prednisone and methotrexate.  However she could not tolerate methotrexate due to excessive production of mucus.  She has been maintained on leflunomide and hydroxychloroquine for the past several years.  However she noted worsening pain in her hands and left heel about 3 months ago.  Humira was added to the DMARDs.  She feels 50% better however still notes pain in her big toes and left heel.  She feels like walking on the stones.  She also reports pain and locking of the right ring finger.     She has Follow-up with podiatrist at [facility] in few weeks.  Previous x-ray of the feet obtained in November 2020.  Showed mild hallux valgus with bunion and multiple first MTP joint osteoarthritis.  Small calcaneal enthesophyte.  MRI is recommended for further evaluation.    She underwent back surgery for herniated disc in 2021.  She underwent PCI/stenting in 2022.    She has positive HLA-B27 however has no history of psoriasis, inflammatory bowel disease, iritis or uveitis.    Current  immunosuppression: HCQ, leflunomide and Humira (1/2024).    Past immunosuppression:  1.  Prednisone.  2.  Methotrexate.    Social history: She lives with her .  She quit smoking about 3 years ago  Family history: She states father, mother and grandmother has had arthritis.    Review of Systems   All other systems reviewed and are negative.    Objective     Blood  status post left nephrectomy for renal cell carcinoma-done on 12/30/19 by Dr. Solomon Sagastume pathology results suggestive of renal cell carcinoma-conventional clear cell type.    -Abdomen abdomen with and without contrast from July 2024-did not show any signs of residual or recurrent or metastatic malignancy in the abdomen  Orders:    Basic metabolic panel; Future     pressure 135/74, weight 88.5 kg (195 lb).    Physical Exam.  Gen. AAO x3, NAD.  HEENT: No pallor or icterus, PERRLA, EOMI. Parotid glands  not enlarged. No cervical lymphadenopathy .  Skin: No rashes.  Heart: S1, S2/ RRR. No murmurs or gallops.  Lungs: CTA B.  Abdomen: Soft, NT/ND, BS regular.  MSK: Pelvirectal Heberden's nodes.  Right ring finger flexor tenderness at A1 pulley.  Handgrip excellent.  Bilateral wrist, elbows and shoulders without swelling and tenderness.  Neck, spine and SI joint without tenderness.  Bilateral Kalin's negative.  Bilateral knee and ankles without swelling or tenderness.  Bilateral Achilles without tenderness.  Bilateral mild hallux valgus.    Neuro: CN II-XII intact. Sensation to touch intact.Strength 5/5 throughout. DTR 2+ and symmetrical.  Psych:Appropriate mood and behavior  EXT: No edema    Assessment/Plan . 58-year-old female with history of seronegative inflammatory arthritis, HLA-B27 positive, CAD s/p PCI, sleep GERD, plantar fasciitis, neuropathy and s/p L4-L5 surgery presented to establish care.    #1: Seronegative inflammatory arthritis.  She has features of osteoarthritis.  She feels 50% better since started on Humira about 3 months ago.  -Continue Humira injection every other week.  -Continue leflunomide 20 mg daily.  -Continue hydroxychloroquine 400 mg daily.  She reports eye exam obtained in September 2023 was negative for hydroxychloroquine ocular toxicity.  -Labs.  -Medical record reviewed.    Follow-up in 4 months.     This note was partially generated using the Dragon Voice recognition system. There may be some incorrect wording, spelling and/or spelling errors or punctuation errors that were not corrected prior to committing the note to the medical record.    Problem List Items Addressed This Visit    None  Visit Diagnoses       Seronegative rheumatoid arthritis (CMS/HCC)    -  Primary    Relevant Medications    hydroxychloroquine (Plaquenil) 200 mg tablet     leflunomide (Arava) 20 mg tablet    Humira,CF, Pen 40 mg/0.4 mL pen injector kit pen-injector    Other Relevant Orders    CBC    Comprehensive Metabolic Panel    T-Spot TB    Uric acid    Citrulline Antibody, IgG    Rheumatoid Factor    Sedimentation Rate    Hepatitis B Core Antibody, IgM    Hepatitis B Surface Antigen    Hepatitis C antibody                 Active Ambulatory Problems     Diagnosis Date Noted    No Active Ambulatory Problems     Resolved Ambulatory Problems     Diagnosis Date Noted    No Resolved Ambulatory Problems     Past Medical History:   Diagnosis Date    Coronary artery disease     GERD (gastroesophageal reflux disease)     HLA B27 positive     Hyperlipidemia     Hypertension     Personal history of other specified conditions 03/23/2021    Seronegative inflammatory arthritis        Family History   Problem Relation Name Age of Onset    Arthritis Mother      Arthritis Father      Arthritis Maternal Grandmother         No past surgical history on file.    Social History     Tobacco Use   Smoking Status Former    Types: Cigarettes   Smokeless Tobacco Never       Allergies  Ticagrelor, Lisinopril, Clonidine, and Sulfa (sulfonamide antibiotics)    Current Meds  Current Outpatient Medications   Medication Instructions    albuterol 90 mcg/actuation inhaler 2 puffs, inhalation, Every 4 hours PRN    aspirin 81 mg, oral, Daily RT    B complex-vitamin C-folic acid (Nephro-Arya Rx) 1- mg-mg-mcg tablet 1 tablet, oral, Every other day    calcium carbonate (Calcium Antacid) 200 mg calcium chewable tablet oral    dicyclomine (BENTYL) 20 mg, oral, Daily RT    glucosamine sulfate 500 mg tablet 1 tablet, oral, Every other day    Humira(CF) Pen 40 mg, subcutaneous, Every 14 days    hydrocortisone (ANUSOL-HC) 25 mg, rectal, 2 times daily    hydroxychloroquine (PLAQUENIL) 400 mg, oral, Every other day    leflunomide (ARAVA) 20 mg, oral, Daily    loperamide (IMODIUM) 2 mg, oral, Every 6 hours PRN    losartan  (COZAAR) 100 mg, oral, Daily RT    metoprolol succinate XL (TOPROL-XL) 25 mg, oral, Daily RT    nitroglycerin (NITROSTAT) 0.4 mg, sublingual    omeprazole (PRILOSEC) 20 mg, oral, Daily RT    prasterone (dhea) 50 mg, oral, Every other day    rosuvastatin (Crestor) 20 mg tablet 1 tablet, oral, Nightly    turmeric root extract 500 mg capsule oral, Every 48 hours    vitamin E 400 Units, oral, Every other day          Nehemiah Ram MD

## 2025-05-28 NOTE — TELEPHONE ENCOUNTER
MRI RT KNEE DENIED  Patients health plan sent a denial for the MRI stating patient has not completed 6 full weeks of provider treatment (PT, NSAIDS, Cortisone, OTC Tylenol). A peer to peer will not overturn this denial as patient has not tried these things for the last 6 weeks. Left patient a message letting her know this. Should she wish to try PT we can place an order just call us back and let us know.

## 2025-05-30 ENCOUNTER — APPOINTMENT (OUTPATIENT)
Dept: RADIOLOGY | Facility: HOSPITAL | Age: 60
End: 2025-05-30
Payer: COMMERCIAL

## 2025-06-13 ENCOUNTER — APPOINTMENT (OUTPATIENT)
Dept: PRIMARY CARE | Facility: CLINIC | Age: 60
End: 2025-06-13
Payer: COMMERCIAL

## 2025-06-13 VITALS — WEIGHT: 187 LBS | DIASTOLIC BLOOD PRESSURE: 76 MMHG | SYSTOLIC BLOOD PRESSURE: 131 MMHG | BODY MASS INDEX: 33.13 KG/M2

## 2025-06-13 DIAGNOSIS — Z12.31 VISIT FOR SCREENING MAMMOGRAM: ICD-10-CM

## 2025-06-13 DIAGNOSIS — Z00.00 HEALTH CARE MAINTENANCE: Primary | ICD-10-CM

## 2025-06-13 DIAGNOSIS — I10 PRIMARY HYPERTENSION: ICD-10-CM

## 2025-06-13 DIAGNOSIS — E78.2 MIXED HYPERLIPIDEMIA: ICD-10-CM

## 2025-06-13 PROCEDURE — 3078F DIAST BP <80 MM HG: CPT | Performed by: INTERNAL MEDICINE

## 2025-06-13 PROCEDURE — 99396 PREV VISIT EST AGE 40-64: CPT | Performed by: INTERNAL MEDICINE

## 2025-06-13 PROCEDURE — 3075F SYST BP GE 130 - 139MM HG: CPT | Performed by: INTERNAL MEDICINE

## 2025-06-13 NOTE — PROGRESS NOTES
Subjective   Patient ID: Karoline Hung is a 60 y.o. female who presents for No chief complaint on file..    HPI CPE see updated front sheet recent cold symptoms her rheumatologist had moved she is taking the 2 rheumatological medicines plus the Humira for her skin together everything appears to be improved discussed with her metabolism also recent cold symptoms bowels loose has been using Imodium for 3 times a week for years no dysuria    Past medical history noted and unchanged hyperlipidemia hypertension    Medications including Crestor    Allergies noted and unchanged see EMR     Social history no tobacco alcohol rare    Family history noted and unchanged    Prevention Limited exercise follows up with the cardiologist sees GYN may be due for mammogram has had colonoscopy    vital signs noted alert and oriented x 3 NCAT PERRLA EOMI nares without discharge OP benign TM normal bilateral EAC clear bilateral no AC nodes no thyromegaly  Review of Systems    Objective   /76   Wt 84.8 kg (187 lb)   BMI 33.13 kg/m²     Physical Exam no JVD or bruit chest clear to auscultation and percussion CV regular rate and rhythm S1-S2 without murmur gallop or rub abdomen soft nontender normal active bowel sounds LS spine normal curvature negative straight leg raise musculoskeletal left knee with some modest effusion no laxity some discomfort medially extremities no clubbing cyanosis or edema normal distal pulses DTR 2+  Assessment/Plan impression General Medical examination hypertension hyperlipidemia other diagnoses rheumatoid and skin (she has plaque on the back of her calves)  Plan follow-up with rheumatology follow-up with dermatology Claritin 10 mg p.o. daily for recent cold symptoms check comprehensive panel advised on glucose potassium and kidney function as well as liver function follow-up for next colonoscopy follow-up for next mammogram requisition made check CBC advised on blood count check lipid panel  advised on cholesterol profile good diet regular exercise good water consumption recheck 3 months based on labs TT 60 cc 31

## 2025-06-14 LAB
ALBUMIN SERPL-MCNC: 4.2 G/DL (ref 3.6–5.1)
ALP SERPL-CCNC: 66 U/L (ref 37–153)
ALT SERPL-CCNC: 24 U/L (ref 6–29)
ANION GAP SERPL CALCULATED.4IONS-SCNC: 5 MMOL/L (CALC) (ref 7–17)
AST SERPL-CCNC: 24 U/L (ref 10–35)
BILIRUB SERPL-MCNC: 0.3 MG/DL (ref 0.2–1.2)
BUN SERPL-MCNC: 13 MG/DL (ref 7–25)
CALCIUM SERPL-MCNC: 9.3 MG/DL (ref 8.6–10.4)
CHLORIDE SERPL-SCNC: 105 MMOL/L (ref 98–110)
CHOLEST SERPL-MCNC: 129 MG/DL
CHOLEST/HDLC SERPL: 3.1 (CALC)
CO2 SERPL-SCNC: 31 MMOL/L (ref 20–32)
CREAT SERPL-MCNC: 0.61 MG/DL (ref 0.5–1.05)
EGFRCR SERPLBLD CKD-EPI 2021: 101 ML/MIN/1.73M2
ERYTHROCYTE [DISTWIDTH] IN BLOOD BY AUTOMATED COUNT: 13.4 % (ref 11–15)
GLUCOSE SERPL-MCNC: 95 MG/DL (ref 65–139)
HCT VFR BLD AUTO: 38 % (ref 35–45)
HDLC SERPL-MCNC: 42 MG/DL
HGB BLD-MCNC: 12 G/DL (ref 11.7–15.5)
LDLC SERPL CALC-MCNC: 68 MG/DL (CALC)
MCH RBC QN AUTO: 29.4 PG (ref 27–33)
MCHC RBC AUTO-ENTMCNC: 31.6 G/DL (ref 32–36)
MCV RBC AUTO: 93.1 FL (ref 80–100)
NONHDLC SERPL-MCNC: 87 MG/DL (CALC)
PLATELET # BLD AUTO: 318 THOUSAND/UL (ref 140–400)
PMV BLD REES-ECKER: 9.5 FL (ref 7.5–12.5)
POTASSIUM SERPL-SCNC: 4.7 MMOL/L (ref 3.5–5.3)
PROT SERPL-MCNC: 7 G/DL (ref 6.1–8.1)
RBC # BLD AUTO: 4.08 MILLION/UL (ref 3.8–5.1)
SODIUM SERPL-SCNC: 141 MMOL/L (ref 135–146)
TRIGL SERPL-MCNC: 104 MG/DL
WBC # BLD AUTO: 7 THOUSAND/UL (ref 3.8–10.8)

## 2025-06-25 DIAGNOSIS — R19.7 DIARRHEA, UNSPECIFIED TYPE: ICD-10-CM

## 2025-06-25 DIAGNOSIS — M06.00 SERONEGATIVE RHEUMATOID ARTHRITIS (MULTI): ICD-10-CM

## 2025-06-25 DIAGNOSIS — Z12.31 VISIT FOR SCREENING MAMMOGRAM: Primary | ICD-10-CM

## 2025-06-25 RX ORDER — HYDROXYCHLOROQUINE SULFATE 200 MG/1
TABLET, FILM COATED ORAL
Qty: 180 TABLET | Refills: 0 | Status: SHIPPED
Start: 2025-06-25 | End: 2025-06-25

## 2025-06-25 RX ORDER — HYDROXYCHLOROQUINE SULFATE 200 MG/1
TABLET, FILM COATED ORAL
Qty: 180 TABLET | Refills: 0 | Status: SHIPPED | OUTPATIENT
Start: 2025-06-25

## 2025-06-25 RX ORDER — LOPERAMIDE HYDROCHLORIDE 2 MG/1
CAPSULE ORAL
Qty: 30 CAPSULE | Refills: 0 | Status: SHIPPED | OUTPATIENT
Start: 2025-06-25

## 2025-06-27 ENCOUNTER — EVALUATION (OUTPATIENT)
Dept: PHYSICAL THERAPY | Facility: CLINIC | Age: 60
End: 2025-06-27
Payer: COMMERCIAL

## 2025-06-27 DIAGNOSIS — G89.29 CHRONIC PAIN OF LEFT KNEE: Primary | ICD-10-CM

## 2025-06-27 DIAGNOSIS — M25.562 CHRONIC PAIN OF LEFT KNEE: Primary | ICD-10-CM

## 2025-06-27 PROCEDURE — 97110 THERAPEUTIC EXERCISES: CPT | Mod: GP

## 2025-06-27 PROCEDURE — 97161 PT EVAL LOW COMPLEX 20 MIN: CPT | Mod: GP

## 2025-06-27 ASSESSMENT — PAIN SCALES - GENERAL: PAINLEVEL_OUTOF10: 4

## 2025-06-27 ASSESSMENT — PAIN - FUNCTIONAL ASSESSMENT: PAIN_FUNCTIONAL_ASSESSMENT: 0-10

## 2025-06-27 ASSESSMENT — ENCOUNTER SYMPTOMS
OCCASIONAL FEELINGS OF UNSTEADINESS: 0
DEPRESSION: 0
LOSS OF SENSATION IN FEET: 0

## 2025-06-27 NOTE — PROGRESS NOTES
Physical Therapy    Physical Therapy Evaluation    Patient Name: Karoline Hung  MRN: [id number]  Today's Date: 6/27/25  Name and date of birth 1965 were confirmed at the start of today's session.     Time Entry:  Time Calculation  Start Time: 0850  Stop Time: 0935  Time Calculation (min): 45 min  PT Evaluation Time Entry  PT Evaluation (Low) Time Entry: 30  PT Therapeutic Procedures Time Entry  Therapeutic Exercise Time Entry: 10                   Assessment  PT Assessment Results: Decreased strength, Decreased range of motion, Pain  Rehab Prognosis: Good  Assessment Comment: Signs and symptoms are consistent with left knee patellofemoral pain with associated weakness of quadriceps/ hip mm and tightness of post/ lat mm (hamstrings and ITbands. She is limping on the right due to some arch discomfort and exhibits a slight trendenlenburg pattern on the left which may be contibuting to lateral knee pain as well.  She will benefit from skilled PT to address her deficits, learn self management strategies and work to achieve consistent pain relief with her typical daily activities.    Plan  Treatment/Interventions: Cryotherapy, Education/ Instruction, Manual therapy, Therapeutic activities, Therapeutic exercises, Taping techniques, Ultrasound  PT Plan: Skilled PT  PT Frequency: 2 times per week  Duration: 4-6 weeks, pending progress  Onset Date: 04/29/25 (date on referral)  Certification Period Start Date: 06/27/25  Certification Period End Date: 09/25/25  Number of Treatments Authorized: pending auth  (has 16/20 visits remaining)  Rehab Potential: Good  Plan of Care Agreement: Patient    Current Problem  1. Chronic pain of left knee  Referral to Physical Therapy    Follow Up In Physical Therapy          Subjective   General:  General  Reason for Referral: Left knee pain, chronic  Referred By: Dr Bravo Hendricks  Past Medical History Relevant to Rehab: Reviewed in Epic and on intake form; recent xrays for left knee  reviewed  General Comment: 4-5 months history of knee pain.; insidious onset.  Worse with walking, walking with carrying things.    Recent xrays reviewed and show mild left knee med jt OA; lateral resting position of patella in trochlear groove.    Precautions:  Precautions  STEADI Fall Risk Score (The score of 4 or more indicates an increased risk of falling): 0  Precautions Comment: RA, Psoriasis    Pain:  Pain Assessment: 0-10  0-10 (Numeric) Pain Score: 4 (7/10 at worst and with activity)    Worse with : walking, walking and carrying something, pushing vacuum, side to side movements  Better with: course of Aleve x 1 month; rest    Home Living:  Home Living Comment: Two story, lives with ; stairs don't bother her knee  Prior Function Per Pt/Caregiver Report:  Level of [place]:  (Independent)  Vocational: Unemployed  Leisure: Water aerobics 3 times/ week  Prior Function Comments: Active and independent    Objective     KNEE    Observation  Observation Comment: knee posture wnl  Knee Palpation/Joint Mobility  Palpation/Joint Mobility Comment: TEnder over left medial jt line, inf/lat pole of patella, gerde's tuburcle, tender over distal IT band  Knee AROM  R knee flexion: (140°): 135 deg  L knee flexion: (140°): 125 deg  R knee extension: (0°): 0 deg  L knee extension: (0°): 0 deg    Knee MMT     Lower Extremity Strength Right Left   Hip Flexors 5 5   Hip Abductors 4+ 4   Hip Extensors 4+ 4+   Hip Adductors 5 4+   Quads 5 5   Hamstrings 5 5   Gastroc/Soleus 4 4   Ankle Dorsiflexors 5 5           30 sec chair rise:   10 reps;   med knee discomfort on left   Front step up/down:  8 in x 5 R/L  ;   right hip pain and feels unsteady; mild left knee discomfort with repetitions    Special Tests   Neg ligamentous testing    Gait  Gait Comment: Slight limp on right, slight trendelenburg on left. Dry/ cracked area of right foot causes her to limp.    Flexibility     Left hamstrings mod tight; right hamstring  WNL  Left IT band mod tight: Right IT band; WNL    Outcome Measures:  Other Measures  Lower Extremity Funtional Score (LEFS): 38     OP EDUCATION:    Individual(s) Educated: Patient  Education Provided: Anatomy, Home Exercise Program, POC  Risk and Benefits Discussed with Patient/Caregiver/Other: yes  Patient/Caregiver Demonstrated Understanding: yes  Plan of Care Discussed and Agreed Upon: yes  Patient Response to Education: Patient/Caregiver Verbalized Understanding of Information, Patient/Caregiver Performed Return Demonstration of Exercises/Activities, Patient/Caregiver Asked Appropriate Questions  Education Comment: HEP handout issued    Access Code: [email or web address]  URL: [email or web address]  Date: 06/27/2025  Prepared by: Serenity Squires    Exercises  - Seated Hamstring Stretch  - 2-3 x daily - 7 x weekly - 3 reps - 20 sec hold  - Supine ITB Stretch with Strap (Mirrored)  - 2-3 x daily - 7 x weekly - 3 reps - 20 sec hold    Today's Treatment:  10 min there ex  Seated Hamstring Stretch  Standing IT band stretch    Goals: to be met by discharge:   Patient Goal: To have less knee pain with all daily activities.    Physical Therapy Goals:   Pain: Will be independent with symptom management strategies and report left knee pain no worse than 2/10 with walking, vacuuming, and return to PLOF.  ROM/ Flexibility: Left knee flexion improved to 135 deg;  left hamstrings and IT band min tight; for decreased pain, improved squatting and performance of daily activities.  Strength: Left LE strength 5/5 throughout; painfree single leg CKC exercises; good control on 8 in step up/down; and indep with HEP for continued strengthening for return to PLOF with minimal pain.  Function: LEFS improved to at least 55/80 and reporting improved walking, standing, squatting tolerance; improved ability to vacuum and participate in prior daily activities.

## 2025-06-28 PROBLEM — G89.29 CHRONIC PAIN OF LEFT KNEE: Status: ACTIVE | Noted: 2025-06-28

## 2025-06-28 PROBLEM — M25.562 CHRONIC PAIN OF LEFT KNEE: Status: ACTIVE | Noted: 2025-06-28

## 2025-06-30 ENCOUNTER — APPOINTMENT (OUTPATIENT)
Dept: ALLERGY | Facility: CLINIC | Age: 60
End: 2025-06-30
Payer: COMMERCIAL

## 2025-06-30 VITALS
WEIGHT: 185.6 LBS | DIASTOLIC BLOOD PRESSURE: 75 MMHG | HEART RATE: 66 BPM | SYSTOLIC BLOOD PRESSURE: 145 MMHG | BODY MASS INDEX: 32.88 KG/M2

## 2025-06-30 DIAGNOSIS — L50.0 ALLERGIC URTICARIA: ICD-10-CM

## 2025-06-30 DIAGNOSIS — R21 RASH: ICD-10-CM

## 2025-06-30 DIAGNOSIS — J30.89 ALLERGIC RHINITIS DUE TO OTHER ALLERGIC TRIGGER, UNSPECIFIED SEASONALITY: Primary | ICD-10-CM

## 2025-06-30 PROCEDURE — 99204 OFFICE O/P NEW MOD 45 MIN: CPT | Performed by: ALLERGY & IMMUNOLOGY

## 2025-06-30 PROCEDURE — 95004 PERQ TESTS W/ALRGNC XTRCS: CPT | Performed by: ALLERGY & IMMUNOLOGY

## 2025-06-30 NOTE — PROGRESS NOTES
Subjective   Patient ID:   [id number]   Karoline Hung is a 60 y.o. female who presents for Allergies (Has all kinds of arthritis and immune issues but not sure what is triggering this.  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 Has had itchy ears, itchy eyes.  ).    Chief Complaint   Patient presents with    Allergies     Has all kinds of arthritis and immune issues but not sure what is triggering this.  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 Has had itchy ears, itchy eyes.            HPI  This patient is here to evaluate for:  Immune issues  Allergic rhinitis and conjunctivitis     She is concerned that her symptoms are allergci re    Heart and eye surgeries    Picture shows red hives on her back    She also has pustular psoriasis on her foot.   Then spread to her back.   She had thought it was related to food allergy from popcorn  Also ham, preservative in lunch meat trigger her arthritis also.  She is using a once a month injection    Seeing Rheumatology.  Humira was making her arthritis so much better but reported that the latter caused the psoriasis. So stopped humira and tried a couple other medications.    Since Dr. Ram left, pt will be seeing Dr. Galvin.     Her dermatologist is [facility].     She also reports itchy eyes, runny nose. Ithcy ears.  She takes flonase spring-fall (was ok in winter until April restarted)  Allegra for itching related to psoriasis  And due to itchy eyes she is taking loratadine.    Pmhx  Seronegative inflammatory arthritis.     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       Review of Systems   All other systems reviewed and are negative.        Objective     /75 (BP Location: Left arm, Patient Position: Sitting)   Pulse 66   Wt 84.2 kg (185 lb 9.6 oz)   BMI 32.88 kg/m²      Physical Exam  Vitals and nursing note reviewed.   Constitutional:       Appearance: Normal  appearance. She is normal weight.   HENT:      Head: Normocephalic and atraumatic.      Right Ear: External ear normal.      Left Ear: External ear normal.      Nose: No septal deviation, congestion or rhinorrhea.      Right Turbinates: Not enlarged, swollen or pale.      Left Turbinates: Not enlarged, swollen or pale.      Comments: Septum unremarkable without perforation or ulceration     Mouth/Throat:      Mouth: Mucous membranes are moist.   Eyes:      Extraocular Movements: Extraocular movements intact.      Conjunctiva/sclera: Conjunctivae normal.      Pupils: Pupils are equal, round, and reactive to light.   Cardiovascular:      Rate and Rhythm: Normal rate and regular rhythm.      Pulses: Normal pulses.      Heart sounds: Normal heart sounds.   Pulmonary:      Effort: Pulmonary effort is normal.      Breath sounds: Normal breath sounds. No wheezing, rhonchi or rales.   Musculoskeletal:         General: Normal range of motion.   Skin:     General: Skin is warm and dry.      Findings: No erythema or rash.      Comments: Right medial foot with mild erythematous; also erythematous large patches on ant shins, and left palm   Neurological:      General: No focal deficit present.      Mental Status: She is alert and oriented to person, place, and time.   Psychiatric:         Mood and Affect: Mood normal.         Behavior: Behavior normal.            Current Medications[1]    Summary of the labs over the past 6 months:    Office Visit on 06/13/2025   Component Date Value Ref Range Status    GLUCOSE 06/13/2025 95  65 - 139 mg/dL Final    UREA NITROGEN (BUN) 06/13/2025 13  7 - 25 mg/dL Final    CREATININE 06/13/2025 0.61  0.50 - 1.05 mg/dL Final    EGFR 06/13/2025 101  > OR = 60 mL/min/1.73m2 Final    SODIUM 06/13/2025 141  135 - 146 mmol/L Final    POTASSIUM 06/13/2025 4.7  3.5 - 5.3 mmol/L Final    CHLORIDE 06/13/2025 105  98 - 110 mmol/L Final    CARBON DIOXIDE 06/13/2025 31  20 - 32 mmol/L Final    ELECTROLYTE  BALANCE 06/13/2025 5 (L)  7 - 17 mmol/L (calc) Final    CALCIUM 06/13/2025 9.3  8.6 - 10.4 mg/dL Final    PROTEIN, TOTAL 06/13/2025 7.0  6.1 - 8.1 g/dL Final    ALBUMIN 06/13/2025 4.2  3.6 - 5.1 g/dL Final    BILIRUBIN, TOTAL 06/13/2025 0.3  0.2 - 1.2 mg/dL Final    ALKALINE PHOSPHATASE 06/13/2025 66  37 - 153 U/L Final    AST 06/13/2025 24  10 - 35 U/L Final    ALT 06/13/2025 24  6 - 29 U/L Final    WHITE BLOOD CELL COUNT 06/13/2025 7.0  3.8 - 10.8 Thousand/uL Final    RED BLOOD CELL COUNT 06/13/2025 4.08  3.80 - 5.10 Million/uL Final    HEMOGLOBIN 06/13/2025 12.0  11.7 - 15.5 g/dL Final    HEMATOCRIT 06/13/2025 38.0  35.0 - 45.0 % Final    MCV 06/13/2025 93.1  80.0 - 100.0 fL Final    MCH 06/13/2025 29.4  27.0 - 33.0 pg Final    MCHC 06/13/2025 31.6 (L)  32.0 - 36.0 g/dL Final    RDW 06/13/2025 13.4  11.0 - 15.0 % Final    PLATELET COUNT 06/13/2025 318  140 - 400 Thousand/uL Final    MPV 06/13/2025 9.5  7.5 - 12.5 fL Final    CHOLESTEROL, TOTAL 06/13/2025 129  <200 mg/dL Final    HDL CHOLESTEROL 06/13/2025 42 (L)  > OR = 50 mg/dL Final    TRIGLYCERIDES 06/13/2025 104  <150 mg/dL Final    LDL-CHOLESTEROL 06/13/2025 68  mg/dL (calc) Final    CHOL/HDLC RATIO 06/13/2025 3.1  <5.0 (calc) Final    NON HDL CHOLESTEROL 06/13/2025 87  <130 mg/dL (calc) Final         Assessment/Plan   Diagnoses and all orders for this visit:  Allergic rhinitis due to other allergic trigger, unspecified seasonality  Allergic urticaria  -     Referral to Allergy  Rash    We performed the allergy testing but results were inconclusive.   I agree with continuing the nasal fluticasone and the antihistamine (ok to change from fexofenadine/allegra to loratadine/claritin as directed by your pcp).    Based on your persistent pruritus, itching of the eyes and history of rashes, we will assess your for Allergic Contact Dermatitis.    I recommend checking for contact dermatitis with patch testing. The patient understands that the patch test cannot  become wet so showers and heavy exercise need to be avoided. Also, please do not use any topical or oral steroids like prednisone as this can interfere with the testing results. But antihistamines for itching can be used. You should bring in any of your own creams, products, etc. that you would like us to also patch test.     The psoriasis is typically a different type of immune (auto) issue that is related to genetics.  I'm glad that you are seeing Rheumatology and Dermatology to help with this.    Gagan Waller MD            [1]   Current Outpatient Medications   Medication Sig Dispense Refill    albuterol 90 mcg/actuation inhaler Inhale 2 puffs every 4 hours if needed. (Patient not taking: Reported on 4/17/2025)      alclometasone (Aclovate) 0.05 % cream Apply topically 2 times a day. (Patient not taking: Reported on 4/17/2025)      aspirin 81 mg EC tablet Take 1 tablet (81 mg) by mouth once daily.      B complex-vitamin C-folic acid (Nephro-Arya Rx) 1- mg-mg-mcg tablet Take 1 tablet by mouth every other day.      calcium carbonate (Calcium Antacid) 200 mg calcium chewable tablet Chew.      clobetasol (Temovate) 0.05 % cream Apply to the left foot 2x daily x 2 weeks then 2x daily 2 days per week 60 g 2    dicyclomine (Bentyl) 20 mg tablet Take 1 tablet (20 mg) by mouth once daily. (Patient not taking: Reported on 4/17/2025)      fluticasone (Flonase) 50 mcg/actuation nasal spray Administer 2 sprays into each nostril once daily. (Patient not taking: Reported on 4/17/2025) 16 g 3    glucosamine sulfate 500 mg tablet Take 1 tablet by mouth every other day.      hydrocortisone (Anusol-HC) 25 mg suppository Insert 1 suppository (25 mg) into the rectum 2 times a day.      hydroxychloroquine (Plaquenil) 200 mg tablet TAKE TWO TABLETS BY MOUTH EVERY  tablet 0    leflunomide (Arava) 20 mg tablet Take 1 tablet (20 mg) by mouth once daily. 90 tablet 0    leflunomide (Arava) 20 mg tablet Take 1 tablet  (20 mg) by mouth once daily. 90 tablet 1    loperamide (Imodium) 2 mg capsule TAKE ONE CAPSULE BY MOUTH EVERY DAY AS NEEDED FOR DIARRHEA 30 capsule 0    losartan (Cozaar) 100 mg tablet Take 1 tablet (100 mg) by mouth once daily.      metoprolol succinate XL (Toprol-XL) 25 mg 24 hr tablet Take 1 tablet (25 mg) by mouth once daily.      nitroglycerin (Nitrostat) 0.4 mg SL tablet Place 1 tablet (0.4 mg) under the tongue.      omeprazole (PriLOSEC) 20 mg DR capsule Take 1 capsule (20 mg) by mouth once daily.      prasterone, DHEA, (DHEA ORAL) Take by mouth.      prasterone, dhea, 50 mg tablet Take 50 mg by mouth every other day.      rosuvastatin (Crestor) 20 mg tablet Take 1 tablet (20 mg) by mouth once daily at bedtime.      turmeric root extract 500 mg capsule Take by mouth every other day.      vitamin E 180 mg (400 unit) capsule Take 1 capsule (400 Units) by mouth every other day.       No current facility-administered medications for this visit.

## 2025-07-03 ENCOUNTER — TREATMENT (OUTPATIENT)
Dept: PHYSICAL THERAPY | Facility: CLINIC | Age: 60
End: 2025-07-03
Payer: COMMERCIAL

## 2025-07-03 DIAGNOSIS — M25.562 CHRONIC PAIN OF LEFT KNEE: ICD-10-CM

## 2025-07-03 DIAGNOSIS — G89.29 CHRONIC PAIN OF LEFT KNEE: ICD-10-CM

## 2025-07-03 PROCEDURE — 97110 THERAPEUTIC EXERCISES: CPT | Mod: GP,CQ | Performed by: PHYSICAL THERAPY ASSISTANT

## 2025-07-03 ASSESSMENT — PAIN - FUNCTIONAL ASSESSMENT: PAIN_FUNCTIONAL_ASSESSMENT: 0-10

## 2025-07-03 ASSESSMENT — PAIN SCALES - GENERAL: PAINLEVEL_OUTOF10: 5 - MODERATE PAIN

## 2025-07-03 NOTE — PROGRESS NOTES
"Physical Therapy    Physical Therapy Treatment    Patient Name: Karoline Hung  MRN: [id number]  : 1965  Today's Date: 7/3/2025  Time Calculation  Start Time: 1106  Stop Time: 1153  Time Calculation (min): 47 min    PT Therapeutic Procedures Time Entry  Therapeutic Exercise Time Entry: 47          VISIT:# 2    Current Problem  Problem List Items Addressed This Visit           ICD-10-CM    Chronic pain of left knee M25.562, G89.29        Subjective    Patient reports pain in Left knee posterior and medial and lateral borders of patella. Reports compliance with HEP.      Pain  Pain Assessment: 0-10  0-10 (Numeric) Pain Score: 5 - Moderate pain          Objective       Painful ITBand stretch      Initiated there ex per flow sheet.    Precautions  Precautions  Precautions Comment: RA, Psoriasis      Treatments:        EXERCISES Date 7/3/25 Date Date Date    REPS REPS REPS REPS          Nustep L3 x 10'             Shuttle  DLP 4B 3 x 10      Shuttle SLP 2B 3 x 10      Shuttle TR/HR 2B 3 x 10             Qhip Flexion 10# 2 x 10      Qhip Extension 10#2 x 10      Qhip Abduction 10# 2 x 10      Qhip  TKE              Q Quad       Q Hamstring               Step Ups FW/lat 8\" 2 x10 FW  1 x 10 lateral                           ITB stretch 30\" x 3      HS stretch 30\" x 3                                                                               Assessment:  Patient completed initial session with reports of decreased pain at 4/10 post session. Reports tender to touch along left IT band and painful with stretch. Reviewed HEP. Pt to ice at home.    Consider ultrasound to ITBand  Plan:   Assess response to initial session and progress ROM/strengthening program accordingly.     Treatment/Interventions: Cryotherapy, Education/ Instruction, Manual therapy, Therapeutic activities, Therapeutic exercises, Taping techniques, Ultrasound  PT Plan: Skilled PT  PT Frequency: 2 times per week  Duration: 4-6 weeks, pending " progress  Onset Date: 04/29/25 (date on referral)  Certification Period Start Date: 06/27/25  Certification Period End Date: 09/25/25  Number of Treatments Authorized: pending auth  (has 16/20 visits remaining)  Rehab Potential: Good  Plan of Care Agreement: Patient    Goals:  Goals: to be met by discharge:   Patient Goal: To have less knee pain with all daily activities.     Physical Therapy Goals:   Pain: Will be independent with symptom management strategies and report left knee pain no worse than 2/10 with walking, vacuuming, and return to PLOF.  ROM/ Flexibility: Left knee flexion improved to 135 deg;  left hamstrings and IT band min tight; for decreased pain, improved squatting and performance of daily activities.  Strength: Left LE strength 5/5 throughout; painfree single leg CKC exercises; good control on 8 in step up/down; and indep with HEP for continued strengthening for return to PLOF with minimal pain.  Function: LEFS improved to at least 55/80 and reporting improved walking, standing, squatting tolerance; improved ability to vacuum and participate in prior daily activities.

## 2025-07-09 ENCOUNTER — TREATMENT (OUTPATIENT)
Dept: PHYSICAL THERAPY | Facility: CLINIC | Age: 60
End: 2025-07-09
Payer: COMMERCIAL

## 2025-07-09 DIAGNOSIS — G89.29 CHRONIC PAIN OF LEFT KNEE: ICD-10-CM

## 2025-07-09 DIAGNOSIS — M25.562 CHRONIC PAIN OF LEFT KNEE: ICD-10-CM

## 2025-07-09 PROCEDURE — 97110 THERAPEUTIC EXERCISES: CPT | Mod: GP,CQ

## 2025-07-09 ASSESSMENT — PAIN SCALES - GENERAL: PAINLEVEL_OUTOF10: 6

## 2025-07-09 ASSESSMENT — PAIN - FUNCTIONAL ASSESSMENT: PAIN_FUNCTIONAL_ASSESSMENT: 0-10

## 2025-07-09 NOTE — PROGRESS NOTES
"Physical Therapy    Physical Therapy Treatment    Patient Name: Karoline Hung  MRN: [id number]  : 1965  Today's Date: 2025  Time Calculation  Start Time: 1345  Stop Time: 1435  Time Calculation (min): 50 min    PT Therapeutic Procedures Time Entry  Therapeutic Exercise Time Entry: 50          VISIT:# 3    Current Problem  Problem List Items Addressed This Visit           ICD-10-CM    Chronic pain of left knee M25.562, G89.29        Subjective    Pt has been doing outside work. Her knee is painful today. Symptoms increase at night. She is not sleeping well.   Going to swim aerobics 3X a week.   Pt wears a knee brace with walking/activity.       Pain  Pain Assessment: 0-10  0-10 (Numeric) Pain Score: 6          Objective       Painful ITBand stretch          Precautions   Precautions  Precautions Comment: RA, Psoriasis      Treatments:    EXERCISES Date 7/3/25 Date 25 Date Date    REPS REPS REPS REPS          Nustep L3 x 10' L4 12 min            Shuttle  DLP 4B 3 x 10 4B 3x15     Shuttle SLP 2B 3 x 10 2B 3x10     Shuttle TR/HR 2B 3 x 10 2B 3x10            Qhip Flexion 10# 2 x 10 20# 2x10     Qhip Extension 10#2 x 10 20# 2x10     Qhip Abduction 10# 2 x 10 20# 2x10     Qhip  TKE              Q Quad       Q Hamstring               Step Ups FW/lat 8\" 2 x10 FW  1 x 10 lateral 8\" 2 x10 FW  1 x 10 lateral                          ITB stretch 30\" x 3 30\"H x3     HS stretch 30\" x 3 30\"Hx3                                                                           Assessment:  Pt completed her exercise program without increased knee pain. She will ice at home.   Pt ambulates with a mild limp. She does not use an assistive device.    Consider ultrasound to ITBand  Plan:   Assess response to initial session and progress ROM/strengthening program accordingly.     Treatment/Interventions: Cryotherapy, Education/ Instruction, Manual therapy, Therapeutic activities, Therapeutic exercises, Taping techniques, " Ultrasound  PT Plan: Skilled PT  PT Frequency: 2 times per week  Duration: 4-6 weeks, pending progress  Onset Date: 04/29/25 (date on referral)  Certification Period Start Date: 06/27/25  Certification Period End Date: 09/25/25  Number of Treatments Authorized: pending auth  (has 16/20 visits remaining)  Rehab Potential: Good  Plan of Care Agreement: Patient    Goals:  Goals: to be met by discharge:   Patient Goal: To have less knee pain with all daily activities.     Physical Therapy Goals:   Pain: Will be independent with symptom management strategies and report left knee pain no worse than 2/10 with walking, vacuuming, and return to PLOF.  ROM/ Flexibility: Left knee flexion improved to 135 deg;  left hamstrings and IT band min tight; for decreased pain, improved squatting and performance of daily activities.  Strength: Left LE strength 5/5 throughout; painfree single leg CKC exercises; good control on 8 in step up/down; and indep with HEP for continued strengthening for return to PLOF with minimal pain.  Function: LEFS improved to at least 55/80 and reporting improved walking, standing, squatting tolerance; improved ability to vacuum and participate in prior daily activities.

## 2025-07-11 ENCOUNTER — APPOINTMENT (OUTPATIENT)
Dept: PHYSICAL THERAPY | Facility: CLINIC | Age: 60
End: 2025-07-11
Payer: COMMERCIAL

## 2025-07-16 ENCOUNTER — TREATMENT (OUTPATIENT)
Dept: PHYSICAL THERAPY | Facility: CLINIC | Age: 60
End: 2025-07-16
Payer: COMMERCIAL

## 2025-07-16 DIAGNOSIS — G89.29 CHRONIC PAIN OF LEFT KNEE: ICD-10-CM

## 2025-07-16 DIAGNOSIS — M25.562 CHRONIC PAIN OF LEFT KNEE: ICD-10-CM

## 2025-07-16 PROCEDURE — 97110 THERAPEUTIC EXERCISES: CPT | Mod: GP,CQ

## 2025-07-16 ASSESSMENT — PAIN SCALES - GENERAL: PAINLEVEL_OUTOF10: 7

## 2025-07-16 ASSESSMENT — PAIN - FUNCTIONAL ASSESSMENT: PAIN_FUNCTIONAL_ASSESSMENT: 0-10

## 2025-07-16 NOTE — PROGRESS NOTES
"Physical Therapy    Physical Therapy Treatment    Patient Name: Karoline Hung  MRN: [id number]  : 1965  Today's Date: 2025  Time Calculation  Start Time: 210  Stop Time: 255  Time Calculation (min): 45 min    PT Therapeutic Procedures Time Entry  Therapeutic Exercise Time Entry: 45          VISIT:# 4    Current Problem  Problem List Items Addressed This Visit           ICD-10-CM    Chronic pain of left knee M25.562, G89.29        Subjective    Pt reports that she was getting something out of her trunk yesterday and \"twisted her knee\". Her knee is painful today.        Pain  Pain Assessment: 0-10  0-10 (Numeric) Pain Score: 7  Pain Location: Knee  Pain Orientation: Left          Objective    Left knee MMT   Hip flexion 4+/5   Quad 5/5   Hamstring 5/5         Precautions   Precautions  Precautions Comment: RA, Psoriasis      Treatments:    EXERCISES Date 7/3/25 Date 25 Date 25 Date    REPS REPS REPS REPS          Nustep L3 x 10' L4 12 min L4 10 min           Shuttle  DLP 4B 3 x 10 4B 3x15 4B 3X10    Shuttle SLP 2B 3 x 10 2B 3x10 3B 2X10, 2B 1X10    Shuttle TR/HR 2B 3 x 10 2B 3x10 2B 3X10           Qhip Flexion 10# 2 x 10 20# 2x10 20# 2X10    Qhip Extension 10#2 x 10 20# 2x10 30# 2X10     Qhip Abduction 10# 2 x 10 20# 2x10 40# 2X10    Qhip  TKE              Q Quad       Q Hamstring               Step Ups FW/lat 8\" 2 x10 FW  1 x 10 lateral 8\" 2 x10 FW  1 x 10 lateral 8\" 2X10 FW  8\" 2X10 lateral                         ITB stretch 30\" x 3 30\"H x3     HS stretch 30\" x 3 30\"Hx3 30\"Hx3 R/L                                                                          Assessment:  Pt was 10 minutes late to PT.  Left knee strength is WNL.  Pt did well with progression of strengthening exercises. No increase of left knee pain throughout her treatment.   Slight limp with walking.     Consider ultrasound to ITBand  Plan:   Assess response to initial session and progress ROM/strengthening program " accordingly.     Treatment/Interventions: Cryotherapy, Education/ Instruction, Manual therapy, Therapeutic activities, Therapeutic exercises, Taping techniques, Ultrasound  PT Plan: Skilled PT  PT Frequency: 2 times per week  Duration: 4-6 weeks, pending progress  Onset Date: 04/29/25 (date on referral)  Certification Period Start Date: 06/27/25  Certification Period End Date: 09/25/25  Number of Treatments Authorized: pending auth  (has 16/20 visits remaining)  Rehab Potential: Good  Plan of Care Agreement: Patient    Goals:  Goals: to be met by discharge:   Patient Goal: To have less knee pain with all daily activities.     Physical Therapy Goals:   Pain: Will be independent with symptom management strategies and report left knee pain no worse than 2/10 with walking, vacuuming, and return to PLOF.  ROM/ Flexibility: Left knee flexion improved to 135 deg;  left hamstrings and IT band min tight; for decreased pain, improved squatting and performance of daily activities.  Strength: Left LE strength 5/5 throughout; painfree single leg CKC exercises; good control on 8 in step up/down; and indep with HEP for continued strengthening for return to PLOF with minimal pain.  Function: LEFS improved to at least 55/80 and reporting improved walking, standing, squatting tolerance; improved ability to vacuum and participate in prior daily activities.

## 2025-07-18 ENCOUNTER — APPOINTMENT (OUTPATIENT)
Dept: PHYSICAL THERAPY | Facility: CLINIC | Age: 60
End: 2025-07-18
Payer: COMMERCIAL

## 2025-07-18 ENCOUNTER — TREATMENT (OUTPATIENT)
Dept: PHYSICAL THERAPY | Facility: CLINIC | Age: 60
End: 2025-07-18
Payer: COMMERCIAL

## 2025-07-18 DIAGNOSIS — M25.562 CHRONIC PAIN OF LEFT KNEE: ICD-10-CM

## 2025-07-18 DIAGNOSIS — G89.29 CHRONIC PAIN OF LEFT KNEE: ICD-10-CM

## 2025-07-18 PROCEDURE — 97110 THERAPEUTIC EXERCISES: CPT | Mod: GP

## 2025-07-18 ASSESSMENT — PAIN - FUNCTIONAL ASSESSMENT: PAIN_FUNCTIONAL_ASSESSMENT: 0-10

## 2025-07-18 ASSESSMENT — PAIN SCALES - GENERAL: PAINLEVEL_OUTOF10: 7

## 2025-07-18 NOTE — PROGRESS NOTES
"Physical Therapy    Physical Therapy Treatment    Patient Name: Karoline Hung  MRN: [id number]  : 1965  Today's Date: 2025  Time Calculation  Start Time: 1445  Stop Time: 1535  Time Calculation (min): 50 min    PT Therapeutic Procedures Time Entry  Therapeutic Exercise Time Entry: 40     Non-Billable Time  Non-billable time: 10  Non-billable time reason: 10' CP L knee following treatment    VISIT:# 5    Current Problem  Problem List Items Addressed This Visit           ICD-10-CM    Chronic pain of left knee M25.562, G89.29        Subjective    Pt reports she felt good following last PT session.         Pain  Pain Assessment: 0-10  0-10 (Numeric) Pain Score: 7  Pain Location: Knee  Pain Orientation: Left          Objective    Left knee MMT   Hip flexion 4+/5   Quad 5/5   Hamstring 5/5         Precautions  Precautions  Precautions Comment: RA, psoriasis      Treatments:    EXERCISES Date 7/3/25 Date 25 Date 25 Date 25    REPS REPS REPS REPS          Nustep L3 x 10' L4 12 min L4 10 min L5 10'          Shuttle  DLP 4B 3 x 10 4B 3x15 4B 3X10 4B 3 x 10   Shuttle SLP 2B 3 x 10 2B 3x10 3B 2X10, 2B 1X10 3B 3 x 10 ea   Shuttle TR/HR 2B 3 x 10 2B 3x10 2B 3X10           Qhip Flexion 10# 2 x 10 20# 2x10 20# 2X10 30# 2 x 10   Qhip Extension 10#2 x 10 20# 2x10 30# 2X10  30# 2 x 10   Qhip Abduction 10# 2 x 10 20# 2x10 40# 2X10 30# 2 x 10   Qhip  TKE              Q Quad       Q Hamstring               Step Ups FW/lat 8\" 2 x10 FW  1 x 10 lateral 8\" 2 x10 FW  1 x 10 lateral 8\" 2X10 FW  8\" 2X10 lateral 8\" 2X10 FW R/L  8\" 2X10 lateral R/L                        ITB stretch 30\" x 3 30\"H x3     HS stretch 30\" x 3 30\"Hx3 30\"Hx3 R/L           CP L knee    10'                                                           Assessment:  Updated HEP d/t patient being out of town for the next 3 weeks starting tomorrow.  Patient unable to complete toe raises d/t increased L knee pain.      Plan:   Assess response to " session and progress ROM/strengthening program accordingly.     Treatment/Interventions: Cryotherapy, Education/ Instruction, Manual therapy, Therapeutic activities, Therapeutic exercises, Taping techniques, Ultrasound  PT Plan: Skilled PT  PT Frequency: 2 times per week  Duration: 4-6 weeks, pending progress  Onset Date: 04/29/25 (date on referral)  Certification Period Start Date: 06/27/25  Certification Period End Date: 09/25/25  Number of Treatments Authorized: pending auth  (has 16/20 visits remaining)  Rehab Potential: Good  Plan of Care Agreement: Patient    Goals:  Goals: to be met by discharge:   Patient Goal: To have less knee pain with all daily activities.     Physical Therapy Goals:   Pain: Will be independent with symptom management strategies and report left knee pain no worse than 2/10 with walking, vacuuming, and return to PLOF.  ROM/ Flexibility: Left knee flexion improved to 135 deg;  left hamstrings and IT band min tight; for decreased pain, improved squatting and performance of daily activities.  Strength: Left LE strength 5/5 throughout; painfree single leg CKC exercises; good control on 8 in step up/down; and indep with HEP for continued strengthening for return to PLOF with minimal pain.  Function: LEFS improved to at least 55/80 and reporting improved walking, standing, squatting tolerance; improved ability to vacuum and participate in prior daily activities.

## 2025-07-22 ENCOUNTER — APPOINTMENT (OUTPATIENT)
Dept: PHYSICAL THERAPY | Facility: CLINIC | Age: 60
End: 2025-07-22
Payer: COMMERCIAL

## 2025-08-07 ENCOUNTER — APPOINTMENT (OUTPATIENT)
Dept: RHEUMATOLOGY | Facility: CLINIC | Age: 60
End: 2025-08-07
Payer: COMMERCIAL

## 2025-08-07 VITALS
HEIGHT: 63 IN | DIASTOLIC BLOOD PRESSURE: 56 MMHG | WEIGHT: 186.4 LBS | HEART RATE: 69 BPM | OXYGEN SATURATION: 95 % | SYSTOLIC BLOOD PRESSURE: 133 MMHG | BODY MASS INDEX: 33.03 KG/M2

## 2025-08-07 DIAGNOSIS — M06.00 SERONEGATIVE RHEUMATOID ARTHRITIS (MULTI): Primary | ICD-10-CM

## 2025-08-07 DIAGNOSIS — L40.50 PSORIATIC ARTHRITIS (MULTI): ICD-10-CM

## 2025-08-07 DIAGNOSIS — Z79.899 ENCOUNTER FOR LONG-TERM CURRENT USE OF MEDICATION: ICD-10-CM

## 2025-08-07 PROCEDURE — 99214 OFFICE O/P EST MOD 30 MIN: CPT | Performed by: INTERNAL MEDICINE

## 2025-08-07 PROCEDURE — 3008F BODY MASS INDEX DOCD: CPT | Performed by: INTERNAL MEDICINE

## 2025-08-07 RX ORDER — AMLODIPINE BESYLATE 10 MG/1
10 TABLET ORAL
COMMUNITY
Start: 2024-09-25

## 2025-08-07 ASSESSMENT — PAIN SCALES - GENERAL: PAINLEVEL_OUTOF10: 7

## 2025-08-08 ENCOUNTER — TREATMENT (OUTPATIENT)
Dept: PHYSICAL THERAPY | Facility: CLINIC | Age: 60
End: 2025-08-08
Payer: COMMERCIAL

## 2025-08-08 DIAGNOSIS — M25.562 CHRONIC PAIN OF LEFT KNEE: ICD-10-CM

## 2025-08-08 DIAGNOSIS — G89.29 CHRONIC PAIN OF LEFT KNEE: ICD-10-CM

## 2025-08-08 LAB
CRP SERPL-MCNC: <3 MG/L
ERYTHROCYTE [SEDIMENTATION RATE] IN BLOOD BY WESTERGREN METHOD: 11 MM/H

## 2025-08-08 PROCEDURE — 97110 THERAPEUTIC EXERCISES: CPT | Mod: GP

## 2025-08-08 ASSESSMENT — PAIN - FUNCTIONAL ASSESSMENT: PAIN_FUNCTIONAL_ASSESSMENT: 0-10

## 2025-08-08 ASSESSMENT — PAIN SCALES - GENERAL: PAINLEVEL_OUTOF10: 7

## 2025-08-11 ENCOUNTER — RESULTS FOLLOW-UP (OUTPATIENT)
Dept: RHEUMATOLOGY | Facility: CLINIC | Age: 60
End: 2025-08-11
Payer: COMMERCIAL

## 2025-08-11 ASSESSMENT — ENCOUNTER SYMPTOMS
ARTHRALGIAS: 1
SHORTNESS OF BREATH: 0
DIARRHEA: 1
ROS GI COMMENTS: HEARTBURN
JOINT SWELLING: 1

## 2025-08-22 ENCOUNTER — TREATMENT (OUTPATIENT)
Dept: PHYSICAL THERAPY | Facility: CLINIC | Age: 60
End: 2025-08-22
Payer: COMMERCIAL

## 2025-08-22 DIAGNOSIS — G89.29 CHRONIC PAIN OF LEFT KNEE: ICD-10-CM

## 2025-08-22 DIAGNOSIS — M25.562 CHRONIC PAIN OF LEFT KNEE: ICD-10-CM

## 2025-08-22 PROCEDURE — 97110 THERAPEUTIC EXERCISES: CPT | Mod: GP,CQ

## 2025-08-22 ASSESSMENT — PAIN SCALES - GENERAL: PAINLEVEL_OUTOF10: 3

## 2025-08-22 ASSESSMENT — PAIN - FUNCTIONAL ASSESSMENT: PAIN_FUNCTIONAL_ASSESSMENT: 0-10

## 2025-08-25 ENCOUNTER — APPOINTMENT (OUTPATIENT)
Dept: ALLERGY | Facility: CLINIC | Age: 60
End: 2025-08-25
Payer: COMMERCIAL

## 2025-08-27 ENCOUNTER — APPOINTMENT (OUTPATIENT)
Dept: ALLERGY | Facility: CLINIC | Age: 60
End: 2025-08-27
Payer: COMMERCIAL

## 2025-08-28 ENCOUNTER — TREATMENT (OUTPATIENT)
Dept: PHYSICAL THERAPY | Facility: CLINIC | Age: 60
End: 2025-08-28
Payer: COMMERCIAL

## 2025-08-28 DIAGNOSIS — M25.562 CHRONIC PAIN OF LEFT KNEE: ICD-10-CM

## 2025-08-28 DIAGNOSIS — G89.29 CHRONIC PAIN OF LEFT KNEE: ICD-10-CM

## 2025-08-28 PROCEDURE — 97110 THERAPEUTIC EXERCISES: CPT | Mod: GP

## 2025-08-28 ASSESSMENT — PAIN SCALES - GENERAL: PAINLEVEL_OUTOF10: 6

## 2025-08-28 ASSESSMENT — PAIN - FUNCTIONAL ASSESSMENT: PAIN_FUNCTIONAL_ASSESSMENT: 0-10

## 2025-08-29 ENCOUNTER — APPOINTMENT (OUTPATIENT)
Dept: ALLERGY | Facility: CLINIC | Age: 60
End: 2025-08-29
Payer: COMMERCIAL

## 2025-09-02 DIAGNOSIS — M06.00 SERONEGATIVE RHEUMATOID ARTHRITIS (MULTI): ICD-10-CM

## 2025-09-02 RX ORDER — LEFLUNOMIDE 20 MG/1
20 TABLET ORAL DAILY
Qty: 90 TABLET | Refills: 1 | Status: SHIPPED | OUTPATIENT
Start: 2025-09-02 | End: 2026-03-01

## 2025-09-05 ENCOUNTER — TREATMENT (OUTPATIENT)
Dept: PHYSICAL THERAPY | Facility: CLINIC | Age: 60
End: 2025-09-05
Payer: COMMERCIAL

## 2025-09-05 DIAGNOSIS — G89.29 CHRONIC PAIN OF LEFT KNEE: ICD-10-CM

## 2025-09-05 DIAGNOSIS — M25.562 CHRONIC PAIN OF LEFT KNEE: ICD-10-CM

## 2025-09-05 PROCEDURE — 97110 THERAPEUTIC EXERCISES: CPT | Mod: GP,CQ | Performed by: PHYSICAL THERAPY ASSISTANT

## 2025-09-05 ASSESSMENT — PAIN - FUNCTIONAL ASSESSMENT: PAIN_FUNCTIONAL_ASSESSMENT: 0-10

## 2025-09-05 ASSESSMENT — PAIN SCALES - GENERAL: PAINLEVEL_OUTOF10: 6

## 2025-12-04 ENCOUNTER — APPOINTMENT (OUTPATIENT)
Dept: RHEUMATOLOGY | Facility: CLINIC | Age: 60
End: 2025-12-04
Payer: COMMERCIAL